# Patient Record
Sex: FEMALE | Race: WHITE | NOT HISPANIC OR LATINO | Employment: FULL TIME | URBAN - METROPOLITAN AREA
[De-identification: names, ages, dates, MRNs, and addresses within clinical notes are randomized per-mention and may not be internally consistent; named-entity substitution may affect disease eponyms.]

---

## 2020-02-09 ENCOUNTER — OFFICE VISIT (OUTPATIENT)
Dept: URGENT CARE | Facility: CLINIC | Age: 50
End: 2020-02-09
Payer: COMMERCIAL

## 2020-02-09 VITALS
HEART RATE: 89 BPM | BODY MASS INDEX: 20.83 KG/M2 | TEMPERATURE: 100 F | SYSTOLIC BLOOD PRESSURE: 139 MMHG | RESPIRATION RATE: 18 BRPM | OXYGEN SATURATION: 98 % | WEIGHT: 125 LBS | DIASTOLIC BLOOD PRESSURE: 73 MMHG | HEIGHT: 65 IN

## 2020-02-09 DIAGNOSIS — J32.9 SINUSITIS, UNSPECIFIED CHRONICITY, UNSPECIFIED LOCATION: Primary | ICD-10-CM

## 2020-02-09 PROCEDURE — 99204 PR OFFICE/OUTPT VISIT, NEW, LEVL IV, 45-59 MIN: ICD-10-PCS | Mod: S$GLB,,, | Performed by: FAMILY MEDICINE

## 2020-02-09 PROCEDURE — 99204 OFFICE O/P NEW MOD 45 MIN: CPT | Mod: S$GLB,,, | Performed by: FAMILY MEDICINE

## 2020-02-09 RX ORDER — AZITHROMYCIN 500 MG/1
TABLET, FILM COATED ORAL
Qty: 3 TABLET | Refills: 0 | Status: SHIPPED | OUTPATIENT
Start: 2020-02-09 | End: 2020-02-27 | Stop reason: ALTCHOICE

## 2020-02-09 RX ORDER — PROMETHAZINE HYDROCHLORIDE AND CODEINE PHOSPHATE 6.25; 1 MG/5ML; MG/5ML
5 SOLUTION ORAL EVERY 4 HOURS PRN
Qty: 120 ML | Refills: 0 | Status: SHIPPED | OUTPATIENT
Start: 2020-02-09 | End: 2020-02-19

## 2020-02-09 RX ORDER — PREDNISONE 10 MG/1
20 TABLET ORAL DAILY
Qty: 6 TABLET | Refills: 0 | Status: SHIPPED | OUTPATIENT
Start: 2020-02-09 | End: 2020-02-12

## 2020-02-09 NOTE — PROGRESS NOTES
"Subjective:       Patient ID: Genie Paul is a 49 y.o. female.    Vitals:  height is 5' 5" (1.651 m) and weight is 56.7 kg (125 lb). Her tympanic temperature is 99.7 °F (37.6 °C). Her blood pressure is 139/73 and her pulse is 89. Her respiration is 18 and oxygen saturation is 98%.     Chief Complaint: Sinus Problem    Patient c/o of sinus problem started two weeks, patient has tried taking Day'Quil which helps a litte bit    Sinus Problem   The current episode started 1 to 4 weeks ago. Associated symptoms include chills, congestion, coughing, ear pain, headaches, a hoarse voice, sinus pressure and a sore throat. Pertinent negatives include no diaphoresis or shortness of breath. Past treatments include oral decongestants. The treatment provided no relief.       Constitution: Positive for chills and fatigue. Negative for sweating and fever.   HENT: Positive for ear pain, congestion, sinus pressure and sore throat. Negative for sinus pain and voice change.    Neck: Negative for painful lymph nodes.   Eyes: Negative for eye redness.   Respiratory: Positive for cough. Negative for chest tightness, sputum production, bloody sputum, COPD, shortness of breath, stridor, wheezing and asthma.    Gastrointestinal: Negative for nausea and vomiting.   Musculoskeletal: Negative for muscle ache.   Skin: Negative for rash.   Allergic/Immunologic: Negative for seasonal allergies and asthma.   Neurological: Positive for headaches.   Hematologic/Lymphatic: Negative for swollen lymph nodes.       Objective:      Physical Exam    nose with coryza  Right ear with fluid  No exudate  Left ear normal  Bilateral sinus TTP  S1 and S2 normal, no murmurs, clicks, gallops or rubs. Regular rate and rhythm. Chest is clear; no wheezes or rales. No edema or JVD.  Throat with pharyngitis, no exudate, copious purulent mucus and she is haorse  No LAD, cervical  Assessment:       1. Sinusitis, unspecified chronicity, unspecified location        Plan:     "     Sinusitis, unspecified chronicity, unspecified location    Other orders  -     azithromycin (ZITHROMAX) 500 MG tablet; Take 1 po daily, take 2 on day one.  Dispense: 3 tablet; Refill: 0  -     predniSONE (DELTASONE) 10 MG tablet; Take 2 tablets (20 mg total) by mouth once daily. for 3 days  Dispense: 6 tablet; Refill: 0  -     promethazine-codeine 6.25-10 mg/5 ml (PHENERGAN WITH CODEINE) 6.25-10 mg/5 mL syrup; Take 5 mLs by mouth every 4 (four) hours as needed for Cough.  Dispense: 120 mL; Refill: 0         Potential medication side effects were discussed with the patient; let me know if any occur.    The current medical regimen is effective;  continue present plan and medications.

## 2020-02-27 ENCOUNTER — OFFICE VISIT (OUTPATIENT)
Dept: URGENT CARE | Facility: CLINIC | Age: 50
End: 2020-02-27
Payer: COMMERCIAL

## 2020-02-27 VITALS
HEIGHT: 65 IN | OXYGEN SATURATION: 96 % | TEMPERATURE: 101 F | WEIGHT: 125 LBS | SYSTOLIC BLOOD PRESSURE: 125 MMHG | HEART RATE: 107 BPM | RESPIRATION RATE: 18 BRPM | BODY MASS INDEX: 20.83 KG/M2 | DIASTOLIC BLOOD PRESSURE: 77 MMHG

## 2020-02-27 DIAGNOSIS — R05.9 COUGH: ICD-10-CM

## 2020-02-27 DIAGNOSIS — K21.9 GASTROESOPHAGEAL REFLUX DISEASE, ESOPHAGITIS PRESENCE NOT SPECIFIED: ICD-10-CM

## 2020-02-27 DIAGNOSIS — R50.9 FEVER, UNSPECIFIED FEVER CAUSE: ICD-10-CM

## 2020-02-27 DIAGNOSIS — Z76.89 ENCOUNTER TO ESTABLISH CARE: ICD-10-CM

## 2020-02-27 DIAGNOSIS — J11.1 INFLUENZA: Primary | ICD-10-CM

## 2020-02-27 LAB
CTP QC/QA: YES
FLUAV AG NPH QL: NEGATIVE
FLUBV AG NPH QL: NEGATIVE

## 2020-02-27 PROCEDURE — 87804 POCT INFLUENZA A/B: ICD-10-PCS | Mod: 59,QW,S$GLB, | Performed by: FAMILY MEDICINE

## 2020-02-27 PROCEDURE — 99214 PR OFFICE/OUTPT VISIT, EST, LEVL IV, 30-39 MIN: ICD-10-PCS | Mod: 25,S$GLB,, | Performed by: FAMILY MEDICINE

## 2020-02-27 PROCEDURE — 87804 INFLUENZA ASSAY W/OPTIC: CPT | Mod: QW,S$GLB,, | Performed by: FAMILY MEDICINE

## 2020-02-27 PROCEDURE — 99214 OFFICE O/P EST MOD 30 MIN: CPT | Mod: 25,S$GLB,, | Performed by: FAMILY MEDICINE

## 2020-02-27 RX ORDER — OSELTAMIVIR PHOSPHATE 75 MG/1
75 CAPSULE ORAL 2 TIMES DAILY
Qty: 10 CAPSULE | Refills: 0 | Status: SHIPPED | OUTPATIENT
Start: 2020-02-27 | End: 2020-03-03

## 2020-02-28 NOTE — PROGRESS NOTES
Subjective:       Patient ID: Genie Paul is a 49 y.o. female.    Vitals:  vitals were not taken for this visit.     Chief Complaint: Sinus Problem    Sinus Problem       ROS    Objective:      Physical Exam      Assessment:       No diagnosis found.    Plan:         There are no diagnoses linked to this encounter.

## 2020-02-28 NOTE — PROGRESS NOTES
"Subjective:       Patient ID: Genie Paul is a 49 y.o. female.    Vitals:  height is 5' 5" (1.651 m) and weight is 56.7 kg (125 lb). Her tympanic temperature is 101.2 °F (38.4 °C) (abnormal). Her blood pressure is 125/77 and her pulse is 107. Her respiration is 18 and oxygen saturation is 96%.     Chief Complaint: Sinus Problem    Patient present with flu like symptoms started a month ago, patient has tried taking Mucin-ex but no relief.   She was seen a month ago and diagnosed with a sinus infection and given an antibiotic. She seemed to be some better though she stioll had a cough but she didn't feel bad. She was still having a mild cough but she was thinking this may be due to GERD which both her mom and sister have. Now she suddenly got worse in the past day or so with fever and aches and worsening cough and chest pain    Sinus Problem   The current episode started more than 1 month ago. Associated symptoms include chills, congestion, coughing, ear pain, headaches, a hoarse voice, sinus pressure and a sore throat. Pertinent negatives include no diaphoresis, shortness of breath or sneezing. Past treatments include oral decongestants. The treatment provided no relief.       Constitution: Positive for chills and fatigue. Negative for sweating and fever.   HENT: Positive for ear pain, congestion, sinus pressure and sore throat. Negative for sinus pain and voice change.    Neck: Negative for painful lymph nodes.   Eyes: Negative for eye redness.   Respiratory: Positive for cough. Negative for chest tightness, sputum production, bloody sputum, COPD, shortness of breath, stridor, wheezing and asthma.    Gastrointestinal: Negative for nausea and vomiting.   Musculoskeletal: Positive for muscle ache.   Skin: Negative for rash.   Allergic/Immunologic: Negative for seasonal allergies, asthma and sneezing.   Neurological: Positive for headaches.   Hematologic/Lymphatic: Negative for swollen lymph nodes.       Objective:    "   Physical Exam   Constitutional: She is oriented to person, place, and time. She appears well-developed and well-nourished. She is cooperative.  Non-toxic appearance. She does not have a sickly appearance. She does not appear ill. No distress.   HENT:   Head: Normocephalic and atraumatic.   Right Ear: Hearing, tympanic membrane, external ear and ear canal normal.   Left Ear: Hearing, tympanic membrane, external ear and ear canal normal.   Nose: No mucosal edema, rhinorrhea or nasal deformity. No epistaxis. Right sinus exhibits no maxillary sinus tenderness and no frontal sinus tenderness. Left sinus exhibits no maxillary sinus tenderness and no frontal sinus tenderness.   Mouth/Throat: Uvula is midline and mucous membranes are normal. No trismus in the jaw. Normal dentition. No uvula swelling. No oropharyngeal exudate, posterior oropharyngeal edema or posterior oropharyngeal erythema.   Clear rhinorrhea  And mild red throat   Eyes: Conjunctivae and lids are normal. No scleral icterus.   Neck: Trachea normal, full passive range of motion without pain and phonation normal. Neck supple. No neck rigidity. No edema and no erythema present.   Cardiovascular: Normal rate, regular rhythm, normal heart sounds, intact distal pulses and normal pulses.   Pulmonary/Chest: Effort normal. No respiratory distress. She has no decreased breath sounds. She has no rhonchi.   Loose course rhonchi   Abdominal: Normal appearance.   Musculoskeletal: Normal range of motion. She exhibits no edema or deformity.   Neurological: She is alert and oriented to person, place, and time. She exhibits normal muscle tone. Coordination normal.   Skin: Skin is warm, dry, intact, not diaphoretic and not pale.   Psychiatric: She has a normal mood and affect. Her speech is normal and behavior is normal. Judgment and thought content normal. Cognition and memory are normal.   Nursing note and vitals reviewed.        Assessment:       1. Influenza    2.  Fever, unspecified fever cause    3. Gastroesophageal reflux disease, esophagitis presence not specified    4. Cough    5. Encounter to establish care        Plan:         Influenza  -     oseltamivir (TAMIFLU) 75 MG capsule; Take 1 capsule (75 mg total) by mouth 2 (two) times daily. for 10 doses  Dispense: 10 capsule; Refill: 0    Fever, unspecified fever cause  -     POCT Influenza A/B    Gastroesophageal reflux disease, esophagitis presence not specified  -     ranitidine (ZANTAC) 150 MG tablet; Take 1 tablet (150 mg total) by mouth 2 (two) times daily.  Dispense: 60 tablet; Refill: 0  -     Ambulatory referral/consult to Internal Medicine    Cough  -     ranitidine (ZANTAC) 150 MG tablet; Take 1 tablet (150 mg total) by mouth 2 (two) times daily.  Dispense: 60 tablet; Refill: 0  -     Ambulatory referral/consult to Internal Medicine    Encounter to establish care  -     Ambulatory referral/consult to Internal Medicine      Patient Instructions       Influenza (Adult)    Influenza is also called the flu. It is a viral illness that affects the air passages of your lungs. It is different from the common cold. The flu can easily be passed from one to person to another. It may be spread through the air by coughing and sneezing. Or it can be spread by touching the sick person and then touching your own eyes, nose, or mouth.  The flu starts 1 to 3 days after you are exposed to the flu virus. It may last for 1 to 2 weeks but many people feel tired or fatigued for many weeks afterward. You usually dont need to take antibiotics unless you have a complication. This might be an ear or sinus infection or pneumonia.  Symptoms of the flu may be mild or severe. They can include extreme tiredness (wanting to stay in bed all day), chills, fevers, muscle aches, soreness with eye movement, headache, and a dry, hacking cough.  Home care  Follow these guidelines when caring for yourself at home:  · Avoid being around cigarette  smoke, whether yours or other peoples.  · Acetaminophen or ibuprofen will help ease your fever, muscle aches, and headache. Dont give aspirin to anyone younger than 18 who has the flu. Aspirin can harm the liver.  · Nausea and loss of appetite are common with the flu. Eat light meals. Drink 6 to 8 glasses of liquids every day. Good choices are water, sport drinks, soft drinks without caffeine, juices, tea, and soup. Extra fluids will also help loosen secretions in your nose and lungs.  · Over-the-counter cold medicines will not make the flu go away faster. But the medicines may help with coughing, sore throat, and congestion in your nose and sinuses. Dont use a decongestant if you have high blood pressure.  · Stay home until your fever has been gone for at least 24 hours without using medicine to reduce fever.  Follow-up care  Follow up with your healthcare provider, or as advised, if you are not getting better over the next week.  If you are age 65 or older, talk with your provider about getting a pneumococcal vaccine every 5 years. You should also get this vaccine if you have chronic asthma or COPD. All adults should get a flu vaccine every fall. Ask your provider about this.  When to seek medical advice  Call your healthcare provider right away if any of these occur:  · Cough with lots of colored mucus (sputum) or blood in your mucus  · Chest pain, shortness of breath, wheezing, or trouble breathing  · Severe headache, or face, neck, or ear pain  · New rash with fever  · Fever of 100.4°F (38°C) or higher, or as directed by your healthcare provider  · Confusion, behavior change, or seizure  · Severe weakness or dizziness  · You get a new fever or cough after getting better for a few days  Date Last Reviewed: 1/1/2017  © 9733-7705 [employer]. [address] [place] [address], [place] [address]. All rights reserved. This information is not intended as a substitute for professional medical care. Always follow  your healthcare professional's instructions.        How Acid Reflux Affects Your Throat    Do you have to clear your throat or cough often? Are you hoarse? Do you have trouble swallowing? If you have these or other throat symptoms, you may have acid reflux. This occurs when stomach acid flows back up and irritates your throat.  Why you have throat symptoms  There are muscles (esophageal sphincters) at both ends of the tube that carries food to your stomach (the esophagus). These muscles relax to let food pass. Then they tighten to keep stomach acid down. When the lower esophageal sphincter (LES) doesnt tighten enough, acid can flow back (reflux) from your stomach into your esophagus. This may cause heartburn. In some cases the upper esophageal sphincter (UES) also doesnt work well. Then acid can travel higher and enter your throat (pharynx). In many cases, this causes throat symptoms.  Common throat symptoms  · Need to clear your throat often  · Feeling like youre choking  · Long-term (chronic) cough  · Hoarseness  · Trouble swallowing  · Feel like you have a lump in your throat  · Sour or acid taste  · Sore throat that keeps coming back   Date Last Reviewed: 7/1/2016  © 2742-2279 [employer]. [address] [place] [address], [place] [address]. All rights reserved. This information is not intended as a substitute for professional medical care. Always follow your healthcare professional's instructions.

## 2020-11-27 ENCOUNTER — OFFICE VISIT (OUTPATIENT)
Dept: OPTOMETRY | Facility: CLINIC | Age: 50
End: 2020-11-27
Payer: COMMERCIAL

## 2020-11-27 DIAGNOSIS — H10.13 ALLERGIC CONJUNCTIVITIS OF BOTH EYES: Primary | ICD-10-CM

## 2020-11-27 PROCEDURE — 92002 PR EYE EXAM, NEW PATIENT,INTERMED: ICD-10-PCS | Mod: S$GLB,,, | Performed by: OPTOMETRIST

## 2020-11-27 PROCEDURE — 99999 PR PBB SHADOW E&M-EST. PATIENT-LVL II: CPT | Mod: PBBFAC,,, | Performed by: OPTOMETRIST

## 2020-11-27 PROCEDURE — 99999 PR PBB SHADOW E&M-EST. PATIENT-LVL II: ICD-10-PCS | Mod: PBBFAC,,, | Performed by: OPTOMETRIST

## 2020-11-27 PROCEDURE — 92002 INTRM OPH EXAM NEW PATIENT: CPT | Mod: S$GLB,,, | Performed by: OPTOMETRIST

## 2020-11-27 RX ORDER — PREDNISOLONE ACETATE 10 MG/ML
SUSPENSION/ DROPS OPHTHALMIC
Qty: 5 ML | Refills: 0 | Status: SHIPPED | OUTPATIENT
Start: 2020-11-27 | End: 2020-12-01

## 2020-11-27 RX ORDER — FLUOROMETHOLONE 1 MG/ML
1 SUSPENSION/ DROPS OPHTHALMIC 4 TIMES DAILY
Qty: 5 ML | Refills: 0 | Status: SHIPPED | OUTPATIENT
Start: 2020-11-27 | End: 2020-11-27

## 2020-11-27 NOTE — PROGRESS NOTES
HPI     Pt here for urgent visit   Has been dealing with very puffy, red, watery, itchy, swollen, and mucousy   OU x 2 months ago  Every morning has to wash eyes with washcloth because very crusty  Gritty feeling OU and soreness type of pain constantly  Feels like OS>OD but they alternate  Pt thinks she may have allergic conjunctivitis  Uses systane multiple times a day with some relief  No blurry va or photophobia   Pt concerned about infection     Last edited by Nicolette Trevino on 11/27/2020 10:49 AM. (History)            Assessment /Plan     For exam results, see Encounter Report.    Allergic conjunctivitis of both eyes    Pred Forte Place 1 drop into both eyes 4 (four) times daily. for 7 days  Then BID x 1 week    Use PF systane Q1-2 hours  Cold compresses QID    RTC 1-2 weeks for follow up

## 2020-11-27 NOTE — TELEPHONE ENCOUNTER
----- Message from Yeni Coyle sent at 11/27/2020  4:55 PM CST -----  Contact: [phone]  Pt requesting a call on her cell

## 2020-12-01 RX ORDER — PREDNISOLONE ACETATE 10 MG/ML
1 SUSPENSION/ DROPS OPHTHALMIC 4 TIMES DAILY
Qty: 5 ML | Refills: 0 | Status: SHIPPED | OUTPATIENT
Start: 2020-12-01 | End: 2020-12-08

## 2020-12-09 ENCOUNTER — OFFICE VISIT (OUTPATIENT)
Dept: OPTOMETRY | Facility: CLINIC | Age: 50
End: 2020-12-09
Payer: COMMERCIAL

## 2020-12-09 DIAGNOSIS — H16.143 SPK (SUPERFICIAL PUNCTATE KERATITIS), BILATERAL: ICD-10-CM

## 2020-12-09 DIAGNOSIS — H10.13 CONJUNCTIVITIS, ALLERGIC, BILATERAL: Primary | ICD-10-CM

## 2020-12-09 DIAGNOSIS — H04.123 DRY EYE SYNDROME, BILATERAL: ICD-10-CM

## 2020-12-09 PROCEDURE — 99999 PR PBB SHADOW E&M-EST. PATIENT-LVL II: ICD-10-PCS | Mod: PBBFAC,,, | Performed by: OPTOMETRIST

## 2020-12-09 PROCEDURE — 92012 PR EYE EXAM, EST PATIENT,INTERMED: ICD-10-PCS | Mod: S$GLB,,, | Performed by: OPTOMETRIST

## 2020-12-09 PROCEDURE — 99999 PR PBB SHADOW E&M-EST. PATIENT-LVL II: CPT | Mod: PBBFAC,,, | Performed by: OPTOMETRIST

## 2020-12-09 PROCEDURE — 92012 INTRM OPH EXAM EST PATIENT: CPT | Mod: S$GLB,,, | Performed by: OPTOMETRIST

## 2020-12-09 NOTE — PROGRESS NOTES
HPI     Pt here for f/u  Has been using FML and PF systane compliantly daily   Pt says she notices a big improvement but still not 100%   Has most pain and discomfort in morning time     Last edited by Nicolette Trevino on 12/9/2020  2:17 PM. (History)            Assessment /Plan     For exam results, see Encounter Report.    Conjunctivitis, allergic, bilateral  SPK (superficial punctate keratitis), bilateral  Dry eye syndrome, bilateral   Pred Forte QD x 2 weeks   PF systane PRN   Refresh PM ointment QHS  -     cetirizine 0.24 % Dpet; Apply 1 drop to eye 2 (two) times daily.  Dispense: 30 each; Refill: 12      RTC if condition worsens or does not improve

## 2020-12-10 ENCOUNTER — PATIENT MESSAGE (OUTPATIENT)
Dept: OPTOMETRY | Facility: CLINIC | Age: 50
End: 2020-12-10

## 2020-12-10 RX ORDER — AZELASTINE HYDROCHLORIDE 0.5 MG/ML
1 SOLUTION/ DROPS OPHTHALMIC 2 TIMES DAILY
Qty: 6 ML | Refills: 6 | Status: SHIPPED | OUTPATIENT
Start: 2020-12-10 | End: 2022-10-03

## 2021-07-07 ENCOUNTER — OFFICE VISIT (OUTPATIENT)
Dept: INTERNAL MEDICINE | Facility: CLINIC | Age: 51
End: 2021-07-07
Payer: COMMERCIAL

## 2021-07-07 DIAGNOSIS — H50.52 EXOPHORIA: ICD-10-CM

## 2021-07-07 DIAGNOSIS — H50.50 HETEROPHORIA: ICD-10-CM

## 2021-07-07 DIAGNOSIS — Z00.00 PHYSICAL EXAM, ANNUAL: ICD-10-CM

## 2021-07-07 DIAGNOSIS — Z76.89 ESTABLISHING CARE WITH NEW DOCTOR, ENCOUNTER FOR: Primary | ICD-10-CM

## 2021-07-07 PROCEDURE — 99396 PREV VISIT EST AGE 40-64: CPT | Mod: 95,,, | Performed by: STUDENT IN AN ORGANIZED HEALTH CARE EDUCATION/TRAINING PROGRAM

## 2021-07-07 PROCEDURE — 99396 PR PREVENTIVE VISIT,EST,40-64: ICD-10-PCS | Mod: 95,,, | Performed by: STUDENT IN AN ORGANIZED HEALTH CARE EDUCATION/TRAINING PROGRAM

## 2021-07-09 ENCOUNTER — HOSPITAL ENCOUNTER (OUTPATIENT)
Dept: RADIOLOGY | Facility: OTHER | Age: 51
Discharge: HOME | End: 2021-07-09
Attending: STUDENT IN AN ORGANIZED HEALTH CARE EDUCATION/TRAINING PROGRAM
Payer: COMMERCIAL

## 2021-07-09 DIAGNOSIS — Z00.00 PHYSICAL EXAM, ANNUAL: ICD-10-CM

## 2021-07-09 DIAGNOSIS — Z12.31 BREAST CANCER SCREENING BY MAMMOGRAM: ICD-10-CM

## 2021-07-09 PROCEDURE — 77067 MAMMO DIGITAL SCREENING BILAT WITH TOMO: ICD-10-PCS | Mod: 26,,, | Performed by: RADIOLOGY

## 2021-07-09 PROCEDURE — 77063 MAMMO DIGITAL SCREENING BILAT WITH TOMO: ICD-10-PCS | Mod: 26,,, | Performed by: RADIOLOGY

## 2021-07-09 PROCEDURE — 77063 BREAST TOMOSYNTHESIS BI: CPT | Mod: 26,,, | Performed by: RADIOLOGY

## 2021-07-09 PROCEDURE — 77067 SCR MAMMO BI INCL CAD: CPT | Mod: 26,,, | Performed by: RADIOLOGY

## 2021-07-09 PROCEDURE — 77067 SCR MAMMO BI INCL CAD: CPT | Mod: TC

## 2021-07-15 ENCOUNTER — OFFICE VISIT (OUTPATIENT)
Dept: INTERNAL MEDICINE | Facility: CLINIC | Age: 51
End: 2021-07-15
Payer: COMMERCIAL

## 2021-07-15 DIAGNOSIS — M33.90 HELIOTROPE EYELID RASH: ICD-10-CM

## 2021-07-15 DIAGNOSIS — H50.50 HETEROPHORIA: Primary | ICD-10-CM

## 2021-07-15 PROCEDURE — 99214 OFFICE O/P EST MOD 30 MIN: CPT | Mod: 95,,, | Performed by: STUDENT IN AN ORGANIZED HEALTH CARE EDUCATION/TRAINING PROGRAM

## 2021-07-15 PROCEDURE — 99214 PR OFFICE/OUTPT VISIT, EST, LEVL IV, 30-39 MIN: ICD-10-PCS | Mod: 95,,, | Performed by: STUDENT IN AN ORGANIZED HEALTH CARE EDUCATION/TRAINING PROGRAM

## 2021-07-15 RX ORDER — ERGOCALCIFEROL 1.25 MG/1
50000 CAPSULE ORAL
Qty: 12 CAPSULE | Refills: 4 | Status: SHIPPED | OUTPATIENT
Start: 2021-07-15 | End: 2022-10-03

## 2021-07-17 RX ORDER — ERGOCALCIFEROL 1.25 MG/1
50000 CAPSULE ORAL
Qty: 12 CAPSULE | Refills: 4 | Status: CANCELLED | OUTPATIENT
Start: 2021-07-17

## 2021-09-17 ENCOUNTER — OFFICE VISIT (OUTPATIENT)
Dept: DERMATOLOGY | Facility: CLINIC | Age: 51
End: 2021-09-17
Payer: COMMERCIAL

## 2021-09-17 DIAGNOSIS — L30.9 DERMATITIS: ICD-10-CM

## 2021-09-17 DIAGNOSIS — L60.3 ONYCHORRHEXIS: ICD-10-CM

## 2021-09-17 DIAGNOSIS — M33.90 HELIOTROPE EYELID RASH: ICD-10-CM

## 2021-09-17 DIAGNOSIS — L73.8 SEBACEOUS HYPERPLASIA: ICD-10-CM

## 2021-09-17 DIAGNOSIS — L30.9 HAND ECZEMA: ICD-10-CM

## 2021-09-17 DIAGNOSIS — Z12.83 SCREENING EXAM FOR SKIN CANCER: ICD-10-CM

## 2021-09-17 DIAGNOSIS — D22.9 MULTIPLE BENIGN NEVI: Primary | ICD-10-CM

## 2021-09-17 DIAGNOSIS — L82.1 SEBORRHEIC KERATOSES: ICD-10-CM

## 2021-09-17 DIAGNOSIS — D18.01 CHERRY ANGIOMA: ICD-10-CM

## 2021-09-17 PROCEDURE — 99205 OFFICE O/P NEW HI 60 MIN: CPT | Mod: S$GLB,,, | Performed by: DERMATOLOGY

## 2021-09-17 PROCEDURE — 99999 PR PBB SHADOW E&M-EST. PATIENT-LVL II: ICD-10-PCS | Mod: PBBFAC,,, | Performed by: DERMATOLOGY

## 2021-09-17 PROCEDURE — 1159F MED LIST DOCD IN RCRD: CPT | Mod: CPTII,S$GLB,, | Performed by: DERMATOLOGY

## 2021-09-17 PROCEDURE — 99205 PR OFFICE/OUTPT VISIT, NEW, LEVL V, 60-74 MIN: ICD-10-PCS | Mod: S$GLB,,, | Performed by: DERMATOLOGY

## 2021-09-17 PROCEDURE — 1160F PR REVIEW ALL MEDS BY PRESCRIBER/CLIN PHARMACIST DOCUMENTED: ICD-10-PCS | Mod: CPTII,S$GLB,, | Performed by: DERMATOLOGY

## 2021-09-17 PROCEDURE — 3044F PR MOST RECENT HEMOGLOBIN A1C LEVEL <7.0%: ICD-10-PCS | Mod: CPTII,S$GLB,, | Performed by: DERMATOLOGY

## 2021-09-17 PROCEDURE — 3044F HG A1C LEVEL LT 7.0%: CPT | Mod: CPTII,S$GLB,, | Performed by: DERMATOLOGY

## 2021-09-17 PROCEDURE — 1159F PR MEDICATION LIST DOCUMENTED IN MEDICAL RECORD: ICD-10-PCS | Mod: CPTII,S$GLB,, | Performed by: DERMATOLOGY

## 2021-09-17 PROCEDURE — 99999 PR PBB SHADOW E&M-EST. PATIENT-LVL II: CPT | Mod: PBBFAC,,, | Performed by: DERMATOLOGY

## 2021-09-17 PROCEDURE — 1160F RVW MEDS BY RX/DR IN RCRD: CPT | Mod: CPTII,S$GLB,, | Performed by: DERMATOLOGY

## 2021-09-17 RX ORDER — TRIAMCINOLONE ACETONIDE 1 MG/G
OINTMENT TOPICAL 2 TIMES DAILY
Qty: 30 G | Refills: 5 | Status: SHIPPED | OUTPATIENT
Start: 2021-09-17 | End: 2022-10-03

## 2021-09-19 ENCOUNTER — PATIENT MESSAGE (OUTPATIENT)
Dept: DERMATOLOGY | Facility: CLINIC | Age: 51
End: 2021-09-19

## 2021-09-27 DIAGNOSIS — M33.90 HELIOTROPE EYELID RASH: Primary | ICD-10-CM

## 2021-09-27 RX ORDER — HYDROCORTISONE 25 MG/G
CREAM TOPICAL
Qty: 20 G | Refills: 0 | Status: SHIPPED | OUTPATIENT
Start: 2021-09-27 | End: 2023-01-06

## 2021-11-26 RX ORDER — ERGOCALCIFEROL 1.25 MG/1
50000 CAPSULE ORAL
Qty: 12 CAPSULE | Refills: 4 | Status: CANCELLED | OUTPATIENT
Start: 2021-11-26

## 2022-03-21 ENCOUNTER — PATIENT MESSAGE (OUTPATIENT)
Dept: ADMINISTRATIVE | Facility: HOSPITAL | Age: 52
End: 2022-03-21
Payer: COMMERCIAL

## 2022-03-22 DIAGNOSIS — Z12.11 SCREENING FOR COLON CANCER: ICD-10-CM

## 2022-03-23 ENCOUNTER — PATIENT MESSAGE (OUTPATIENT)
Dept: ADMINISTRATIVE | Facility: HOSPITAL | Age: 52
End: 2022-03-23
Payer: COMMERCIAL

## 2022-03-23 ENCOUNTER — PATIENT OUTREACH (OUTPATIENT)
Dept: ADMINISTRATIVE | Facility: HOSPITAL | Age: 52
End: 2022-03-23
Payer: COMMERCIAL

## 2022-03-23 NOTE — PROGRESS NOTES
Chart reviewed  Immunizations reconciled   Release sent   No pap in quest  Portal message sent

## 2022-03-23 NOTE — LETTER
AUTHORIZATION FOR RELEASE OF   CONFIDENTIAL INFORMATION        We are seeing Genie Paul, date of birth 1970, in the clinic at [facility]. Kris Minor MD is the patient's PCP. Genie Paul has an outstanding lab/procedure at the time we reviewed her chart. In order to help keep her health information updated, she has authorized us to request the following medical record(s):        (  )  MAMMOGRAM                                      (  )  COLONOSCOPY      (x  )  PAP SMEAR                                          (  )  OUTSIDE LAB RESULTS     (  )  DEXA SCAN                                          (  )  EYE EXAM            (  )  FOOT EXAM                                          (  )  ENTIRE RECORD     (  )  OUTSIDE IMMUNIZATIONS                 (  )  _______________         Please fax records to [facility], Tarek C Balamane, MD, [phone]     If you have any questions, please contact Cheryl at [phone]          Patient Name: Genie Paul  : 1970  Patient Phone #: [phone]

## 2022-04-01 ENCOUNTER — TELEPHONE (OUTPATIENT)
Dept: OBSTETRICS AND GYNECOLOGY | Facility: CLINIC | Age: 52
End: 2022-04-01
Payer: COMMERCIAL

## 2022-05-16 ENCOUNTER — PATIENT OUTREACH (OUTPATIENT)
Dept: ADMINISTRATIVE | Facility: HOSPITAL | Age: 52
End: 2022-05-16
Payer: COMMERCIAL

## 2022-05-16 ENCOUNTER — PATIENT MESSAGE (OUTPATIENT)
Dept: ADMINISTRATIVE | Facility: HOSPITAL | Age: 52
End: 2022-05-16
Payer: COMMERCIAL

## 2022-06-01 ENCOUNTER — PATIENT MESSAGE (OUTPATIENT)
Dept: ADMINISTRATIVE | Facility: HOSPITAL | Age: 52
End: 2022-06-01
Payer: COMMERCIAL

## 2022-07-14 ENCOUNTER — PATIENT MESSAGE (OUTPATIENT)
Dept: ADMINISTRATIVE | Facility: HOSPITAL | Age: 52
End: 2022-07-14
Payer: COMMERCIAL

## 2022-07-26 LAB — HEMOCCULT STL QL IA: NEGATIVE

## 2022-09-19 ENCOUNTER — PATIENT OUTREACH (OUTPATIENT)
Dept: ADMINISTRATIVE | Facility: HOSPITAL | Age: 52
End: 2022-09-19
Payer: COMMERCIAL

## 2022-09-19 NOTE — PROGRESS NOTES
Health Maintenance Due   Topic Date Due    TETANUS VACCINE  Never done    Cervical Cancer Screening  09/03/2016    Mammogram  07/09/2022    Shingles Vaccine (1 of 2) Never done    Influenza Vaccine (1) 09/01/2022

## 2022-10-03 ENCOUNTER — OFFICE VISIT (OUTPATIENT)
Dept: INTERNAL MEDICINE | Facility: CLINIC | Age: 52
End: 2022-10-03
Payer: COMMERCIAL

## 2022-10-03 ENCOUNTER — HOSPITAL ENCOUNTER (EMERGENCY)
Facility: OTHER | Age: 52
Discharge: HOME | End: 2022-10-03
Attending: EMERGENCY MEDICINE
Payer: COMMERCIAL

## 2022-10-03 VITALS
RESPIRATION RATE: 16 BRPM | SYSTOLIC BLOOD PRESSURE: 124 MMHG | WEIGHT: 130 LBS | BODY MASS INDEX: 21.66 KG/M2 | HEART RATE: 80 BPM | DIASTOLIC BLOOD PRESSURE: 64 MMHG | HEIGHT: 65 IN | TEMPERATURE: 99 F | OXYGEN SATURATION: 97 %

## 2022-10-03 DIAGNOSIS — R53.1 WEAKNESS: ICD-10-CM

## 2022-10-03 DIAGNOSIS — F10.939 ALCOHOL WITHDRAWAL SYNDROME WITH COMPLICATION: Primary | ICD-10-CM

## 2022-10-03 DIAGNOSIS — F10.10 ALCOHOL ABUSE: Primary | ICD-10-CM

## 2022-10-03 LAB
ALBUMIN SERPL BCP-MCNC: 4.2 G/DL (ref 3.5–5.2)
ALP SERPL-CCNC: 49 U/L (ref 55–135)
ALT SERPL W/O P-5'-P-CCNC: 16 U/L (ref 10–44)
ANION GAP SERPL CALC-SCNC: 13 MMOL/L (ref 8–16)
AST SERPL-CCNC: 21 U/L (ref 10–40)
B-OH-BUTYR BLD STRIP-SCNC: 0.5 MMOL/L (ref 0–0.5)
BASOPHILS # BLD AUTO: 0.08 K/UL (ref 0–0.2)
BASOPHILS NFR BLD: 1 % (ref 0–1.9)
BILIRUB SERPL-MCNC: 0.5 MG/DL (ref 0.1–1)
BUN SERPL-MCNC: 6 MG/DL (ref 6–20)
CALCIUM SERPL-MCNC: 9.6 MG/DL (ref 8.7–10.5)
CHLORIDE SERPL-SCNC: 105 MMOL/L (ref 95–110)
CO2 SERPL-SCNC: 19 MMOL/L (ref 23–29)
CREAT SERPL-MCNC: 0.7 MG/DL (ref 0.5–1.4)
DIFFERENTIAL METHOD: ABNORMAL
EOSINOPHIL # BLD AUTO: 0.2 K/UL (ref 0–0.5)
EOSINOPHIL NFR BLD: 2.2 % (ref 0–8)
ERYTHROCYTE [DISTWIDTH] IN BLOOD BY AUTOMATED COUNT: 13.5 % (ref 11.5–14.5)
EST. GFR  (NO RACE VARIABLE): >60 ML/MIN/1.73 M^2
ETHANOL SERPL-MCNC: <10 MG/DL
GLUCOSE SERPL-MCNC: 89 MG/DL (ref 70–110)
HCT VFR BLD AUTO: 45 % (ref 37–48.5)
HCV AB SERPL QL IA: NEGATIVE
HGB BLD-MCNC: 15.1 G/DL (ref 12–16)
HIV 1+2 AB+HIV1 P24 AG SERPL QL IA: NEGATIVE
IMM GRANULOCYTES # BLD AUTO: 0.02 K/UL (ref 0–0.04)
IMM GRANULOCYTES NFR BLD AUTO: 0.2 % (ref 0–0.5)
LIPASE SERPL-CCNC: 17 U/L (ref 4–60)
LYMPHOCYTES # BLD AUTO: 1.9 K/UL (ref 1–4.8)
LYMPHOCYTES NFR BLD: 23.4 % (ref 18–48)
MAGNESIUM SERPL-MCNC: 2.1 MG/DL (ref 1.6–2.6)
MCH RBC QN AUTO: 31.2 PG (ref 27–31)
MCHC RBC AUTO-ENTMCNC: 33.6 G/DL (ref 32–36)
MCV RBC AUTO: 93 FL (ref 82–98)
MONOCYTES # BLD AUTO: 0.4 K/UL (ref 0.3–1)
MONOCYTES NFR BLD: 5 % (ref 4–15)
NEUTROPHILS # BLD AUTO: 5.5 K/UL (ref 1.8–7.7)
NEUTROPHILS NFR BLD: 68.2 % (ref 38–73)
NRBC BLD-RTO: 0 /100 WBC
PLATELET # BLD AUTO: 268 K/UL (ref 150–450)
PMV BLD AUTO: 9.8 FL (ref 9.2–12.9)
POCT GLUCOSE: 77 MG/DL (ref 70–110)
POTASSIUM SERPL-SCNC: 4 MMOL/L (ref 3.5–5.1)
PROT SERPL-MCNC: 7.6 G/DL (ref 6–8.4)
RBC # BLD AUTO: 4.84 M/UL (ref 4–5.4)
SODIUM SERPL-SCNC: 137 MMOL/L (ref 136–145)
VIT B12 SERPL-MCNC: 878 PG/ML (ref 210–950)
WBC # BLD AUTO: 8.08 K/UL (ref 3.9–12.7)

## 2022-10-03 PROCEDURE — 82010 KETONE BODYS QUAN: CPT

## 2022-10-03 PROCEDURE — 82962 GLUCOSE BLOOD TEST: CPT

## 2022-10-03 PROCEDURE — 86803 HEPATITIS C AB TEST: CPT

## 2022-10-03 PROCEDURE — 93005 ELECTROCARDIOGRAM TRACING: CPT

## 2022-10-03 PROCEDURE — 85025 COMPLETE CBC W/AUTO DIFF WBC: CPT

## 2022-10-03 PROCEDURE — 80053 COMPREHEN METABOLIC PANEL: CPT

## 2022-10-03 PROCEDURE — 93010 ELECTROCARDIOGRAM REPORT: CPT | Mod: ,,, | Performed by: INTERNAL MEDICINE

## 2022-10-03 PROCEDURE — 99214 PR OFFICE/OUTPT VISIT, EST, LEVL IV, 30-39 MIN: ICD-10-PCS | Mod: 95,,, | Performed by: NURSE PRACTITIONER

## 2022-10-03 PROCEDURE — 99214 OFFICE O/P EST MOD 30 MIN: CPT | Mod: 95,,, | Performed by: NURSE PRACTITIONER

## 2022-10-03 PROCEDURE — 1160F PR REVIEW ALL MEDS BY PRESCRIBER/CLIN PHARMACIST DOCUMENTED: ICD-10-PCS | Mod: CPTII,95,, | Performed by: NURSE PRACTITIONER

## 2022-10-03 PROCEDURE — 87389 HIV-1 AG W/HIV-1&-2 AB AG IA: CPT

## 2022-10-03 PROCEDURE — 1159F PR MEDICATION LIST DOCUMENTED IN MEDICAL RECORD: ICD-10-PCS | Mod: CPTII,95,, | Performed by: NURSE PRACTITIONER

## 2022-10-03 PROCEDURE — 63600175 PHARM REV CODE 636 W HCPCS

## 2022-10-03 PROCEDURE — 96374 THER/PROPH/DIAG INJ IV PUSH: CPT

## 2022-10-03 PROCEDURE — 1159F MED LIST DOCD IN RCRD: CPT | Mod: CPTII,95,, | Performed by: NURSE PRACTITIONER

## 2022-10-03 PROCEDURE — 82607 VITAMIN B-12: CPT

## 2022-10-03 PROCEDURE — 83690 ASSAY OF LIPASE: CPT

## 2022-10-03 PROCEDURE — 93010 EKG 12-LEAD: ICD-10-PCS | Mod: ,,, | Performed by: INTERNAL MEDICINE

## 2022-10-03 PROCEDURE — 1160F RVW MEDS BY RX/DR IN RCRD: CPT | Mod: CPTII,95,, | Performed by: NURSE PRACTITIONER

## 2022-10-03 PROCEDURE — 96361 HYDRATE IV INFUSION ADD-ON: CPT

## 2022-10-03 PROCEDURE — 96375 TX/PRO/DX INJ NEW DRUG ADDON: CPT

## 2022-10-03 PROCEDURE — 25000003 PHARM REV CODE 250

## 2022-10-03 PROCEDURE — 99284 EMERGENCY DEPT VISIT MOD MDM: CPT | Mod: 25

## 2022-10-03 PROCEDURE — 82077 ASSAY SPEC XCP UR&BREATH IA: CPT | Performed by: EMERGENCY MEDICINE

## 2022-10-03 PROCEDURE — 83735 ASSAY OF MAGNESIUM: CPT

## 2022-10-03 RX ORDER — ONDANSETRON 2 MG/ML
4 INJECTION INTRAMUSCULAR; INTRAVENOUS
Status: COMPLETED | OUTPATIENT
Start: 2022-10-03 | End: 2022-10-03

## 2022-10-03 RX ORDER — LANOLIN ALCOHOL/MO/W.PET/CERES
100 CREAM (GRAM) TOPICAL
Status: COMPLETED | OUTPATIENT
Start: 2022-10-03 | End: 2022-10-03

## 2022-10-03 RX ORDER — DIAZEPAM 10 MG/2ML
2 INJECTION INTRAMUSCULAR
Status: COMPLETED | OUTPATIENT
Start: 2022-10-03 | End: 2022-10-03

## 2022-10-03 RX ADMIN — ONDANSETRON 4 MG: 2 INJECTION INTRAMUSCULAR; INTRAVENOUS at 11:10

## 2022-10-03 RX ADMIN — SODIUM CHLORIDE, SODIUM LACTATE, POTASSIUM CHLORIDE, AND CALCIUM CHLORIDE 1000 ML: .6; .31; .03; .02 INJECTION, SOLUTION INTRAVENOUS at 01:10

## 2022-10-03 RX ADMIN — SODIUM CHLORIDE, SODIUM LACTATE, POTASSIUM CHLORIDE, AND CALCIUM CHLORIDE 1000 ML: .6; .31; .03; .02 INJECTION, SOLUTION INTRAVENOUS at 11:10

## 2022-10-03 RX ADMIN — Medication 100 MG: at 11:10

## 2022-10-03 RX ADMIN — DIAZEPAM 2 MG: 5 INJECTION, SOLUTION INTRAMUSCULAR; INTRAVENOUS at 11:10

## 2022-10-03 NOTE — ED PROVIDER NOTES
"Encounter Date: 10/3/2022       History     Chief Complaint   Patient presents with    Withdrawal     C/o tremors, nausea, weakness, dizzy s/p ETOH withdrawal. Stated approx "10 drinks" daily for the past months. Last ETOH 0300am. Denies auditory or visual hallucination. Tremors noted.  all other VSS.        Ms. Paul is a pleasant 52-year-old female who presents with concerns of alcohol withdrawal. She states that she last drink 3 shots of whiskey this morning at 3:00 a.m. in order to prevent feeling ill.  Her symptoms at this time include feeling weak with associated nausea.  She states that she is "shaky."  Patient reports drinking heavily for the past couple of years, however, states that her drinking has significantly increased in the last couple of months. She states she is currently drinking a bottle of wine or more a day.  Patient has not attempted to quit drinking alcohol before, and has never been to detox or rehab.  She denies any history of seizures.  Denies fever, chills, hallucinations, shortness of breath, chest pain, abdominal pain.    The history is provided by the patient.   Review of patient's allergies indicates:  No Known Allergies  History reviewed. No pertinent past medical history.  History reviewed. No pertinent surgical history.  Family History   Problem Relation Age of Onset    Cancer Mother         GYN    Autoimmune disease Mother     Colon cancer Neg Hx     Breast cancer Neg Hx     Ovarian cancer Neg Hx      Social History     Tobacco Use    Smoking status: Never   Substance Use Topics    Alcohol use: Yes     Comment: Bottle of wine daily     Drug use: No     Review of Systems   Constitutional:  Negative for chills and fever.   HENT:  Negative for congestion, rhinorrhea and sore throat.    Eyes:  Negative for pain.   Respiratory:  Negative for cough and shortness of breath.    Cardiovascular:  Negative for chest pain.   Gastrointestinal:  Positive for nausea. Negative for abdominal " pain, diarrhea and vomiting.   Genitourinary:  Negative for dysuria and flank pain.   Musculoskeletal:  Negative for arthralgias and myalgias.   Skin: Negative.    Neurological:  Positive for tremors and weakness. Negative for seizures, numbness and headaches.   Hematological: Negative.    Psychiatric/Behavioral:  Negative for agitation, confusion and hallucinations. The patient is nervous/anxious.         Substance abuse     Physical Exam     Initial Vitals [10/03/22 1047]   BP Pulse Resp Temp SpO2   (!) 164/91 110 17 98.3 °F (36.8 °C) 97 %      MAP       --         Physical Exam    Constitutional: Vital signs are normal. She appears well-developed and well-nourished. She is cooperative.   HENT:   Head: Normocephalic and atraumatic.   Eyes: Conjunctivae and lids are normal.   Neck: Trachea normal. Neck supple. No thyroid mass present.   Cardiovascular:  Normal rate, regular rhythm and normal heart sounds.           Pulmonary/Chest: Breath sounds normal. No respiratory distress. She has no wheezes.   Abdominal: Abdomen is soft. She exhibits no distension. There is no abdominal tenderness.   Musculoskeletal:      Cervical back: Neck supple.     Neurological: She is alert and oriented to person, place, and time. She has normal strength. No cranial nerve deficit or sensory deficit. Coordination normal. GCS eye subscore is 4. GCS verbal subscore is 5. GCS motor subscore is 6.   Skin: Skin is warm, dry and intact. No rash noted.   Psychiatric: She has a normal mood and affect. Her speech is normal and behavior is normal. Thought content normal. She expresses inappropriate judgment.       ED Course     Labs Reviewed   CBC W/ AUTO DIFFERENTIAL - Abnormal; Notable for the following components:       Result Value    MCH 31.2 (*)     All other components within normal limits   COMPREHENSIVE METABOLIC PANEL - Abnormal; Notable for the following components:    CO2 19 (*)     Alkaline Phosphatase 49 (*)     All other components  within normal limits   BETA - HYDROXYBUTYRATE, SERUM   LIPASE   VITAMIN B12   MAGNESIUM   HIV 1 / 2 ANTIBODY    Narrative:     Release to patient->Immediate   HEPATITIS C ANTIBODY    Narrative:     Release to patient->Immediate   ALCOHOL,MEDICAL (ETHANOL)   POCT GLUCOSE   POCT GLUCOSE MONITORING CONTINUOUS          Imaging Results    None          Medications   lactated ringers bolus 1,000 mL (0 mLs Intravenous Stopped 10/3/22 1257)   diazePAM injection 2 mg (2 mg Intravenous Given 10/3/22 1143)   ondansetron injection 4 mg (4 mg Intravenous Given 10/3/22 1140)   thiamine tablet 100 mg (100 mg Oral Given 10/3/22 1140)   lactated ringers bolus 1,000 mL (1,000 mLs Intravenous New Bag 10/3/22 1326)     Medical Decision Making:   Initial Assessment:   Urgent evaluation of a 52-year-old with substance abuse and concern for alcohol withdrawal.  She does present with mild alcohol withdrawal so will administer Ativan at this time. She does not appear diaphoretic or tremulous.  I am not concerned for seizures at this time, however, I will keep a close eye on her.  Plan for labs and supportive care.  Differential Diagnosis:   Differential Diagnosis includes, but is not limited to:  Substance abuse/withdrawal, medication toxicity, thyroid disease, acute stress reaction, personality disorder, metabolic derangement     ED Management:  Labs reassuring for not severe alcohol withdrawal.  No evidence of alcoholic ketoacidosis, acute pancreatitis, or alcoholic hepatitis.  I do not believe patient meets criteria for emergent hospital admission for benzodiazepine therapy at this time.  Low suspicion for acute abdominal process given benign abdominal exam.  Patient would benefit from intensive outpatient therapy and potential for detox.  I educated the patient that while she is stable here today, this does not preclude her from the compensating and having worrisome symptoms that require emergent care.  I informed her about the symptoms  and educated her on returning if any of these symptoms arise.  Will provide referral for internal medicine to establish primary care as she would like a new 1, and an additional referral for Psychiatry to discuss underlying anxiety.  I will provide resources for local detox and outpatient therapy in addition to resources for 12 step meetings.  After taking into careful account the patient's history, physical exam findings, as well as empirical and objective data obtained throughout ED workup, I feel no emergent medical condition has been identified. No further evaluation or admission was felt to be required, and the patient is stable for discharge from the ED. The patient was updated with test results, overall clinical impression, and recommended further plan of care, including discharge instructions as provided and outpatient follow-up for continued evaluation and management as needed. All questions were answered. The patient expressed understanding and agreed with current plan for discharge and follow-up plan of care. Strict ED return precautions were provided, including return/worsening of current symptoms, new symptoms, or any other concerns.            Attending Attestation:     Physician Attestation Statement for NP/PA:   I have conducted a face to face encounter with this patient in addition to the NP/PA, due to NP/PA Request    Other NP/PA Attestation Additions:      Medical Decision Making: Genie Paul is a 52 y.o. female presenting with mild alcohol withdrawal.  Patient has no evidence symptoms at this point but has experienced mild symptoms upon alcohol cessation recently.  There is no piloerection, tremulousness, tachycardia.  I do not think she requires admission or further emergent benzodiazepine therapy here.  She is appropriate for close outpatient follow-up for detox.  I have very low suspicion for alternative life-threatening process such as ACS or arrhythmia.  EKG reviewed.  There is a benign  abdominal exam with no tenderness, distention, masses, palpable hernias.  I doubt acute intra-abdominal process.  On my exam cranial nerves 3-12 intact with 5/5 strength sensation.  I doubt acute intracranial process such as CVA.  Laboratories reviewed.  Detailed return precautions reviewed as well.             ED Course as of 10/03/22 1448   Mon Oct 03, 2022   1230 Lipase: 17 [LAITH]   1230 AST: 21 [LAITH]   1230 ALT: 16 [LAITH]   1230 Beta-Hydroxybutyrate: 0.5 [LAITH]   1357 Alcohol, Serum: <10 [LAITH]   1358 POCT Glucose: 77 [LAITH]   1440 EKG:  NSR, rate of 81, normal intervals, L axis.  There are no acute ST or T wave changes suggestive of acute ischemia or infarction.  No sign of arrhythmia including no delta waves, Brugada syndrome, or signs of hypertrophic cardiomyopathy. [MR]      ED Course User Index  [LAITH] Frannie Ugarte PA-C  [MR] Hector Porras MD                 Clinical Impression:   Final diagnoses:  [F10.10] Alcohol abuse (Primary)  [R53.1] Weakness        ED Disposition Condition    Discharge Stable          ED Prescriptions    None       Follow-up Information       Follow up With Specialties Details Why Contact Info    Kris Minor MD Family Medicine  For follow-up [address] [facility] [id number]  [phone]      [facility] Emergency Dept Emergency Medicine  If symptoms worsen [address] [facility] [id number] [phone]             Frannie Ugarte PA-C  10/03/22 0803

## 2022-10-03 NOTE — ED NOTES
Pt rounding complete.  Pain 0/10. Pt resting comfortably in bed. Denies any hallucinations. No vomiting noted. Pt aaox4.  Restroom and comfort needs addressed.  Pt updated on plan of care.  Call light within reach.  Will continue to monitor.

## 2022-10-03 NOTE — PROGRESS NOTES
Subjective:       Patient ID: Genie Paul is a 52 y.o. female.    Chief Complaint: No chief complaint on file.    HPI    This is a 51 y/o female patient of Dr. Giron who is new to me and presents with c/o alcohol withdrawal. She reports is getting tremors, chest tightness. She reports that she doesn't want to drink anymore but is afraid of the physical symptoms she is getting from the withdrawals.     Discussed going to the ER for immediate treatment of the physical symptoms and pysch eval. Pt willing to go to the ER. Reports she lives a few blocks from [facility] [place] and will go there.       Review of Systems   Constitutional:  Positive for activity change. Negative for unexpected weight change.   HENT:  Negative for hearing loss, rhinorrhea and trouble swallowing.    Eyes:  Negative for discharge and visual disturbance.   Respiratory:  Negative for chest tightness and wheezing.    Cardiovascular:  Positive for palpitations. Negative for chest pain.   Gastrointestinal:  Positive for diarrhea. Negative for blood in stool, constipation and vomiting.   Endocrine: Negative for polydipsia and polyuria.   Genitourinary:  Negative for difficulty urinating, dysuria, hematuria and menstrual problem.   Musculoskeletal:  Negative for arthralgias, joint swelling and neck pain.   Neurological:  Positive for weakness and headaches.   Psychiatric/Behavioral:  Positive for confusion and dysphoric mood.        Objective:      Physical Exam  Constitutional:       General: She is not in acute distress.     Appearance: Normal appearance. She is ill-appearing.   HENT:      Head: Normocephalic and atraumatic.   Cardiovascular:      Comments: No s/s of distress noted  Pulmonary:      Effort: Pulmonary effort is normal. No respiratory distress.   Skin:     General: Skin is dry.   Neurological:      Mental Status: She is alert and oriented to person, place, and time.       Assessment:       Problem List Items Addressed This Visit    None       Plan:       Called [facility] to make aware of sending pt from her home for immediate treatment of withdrawal symptoms and psych eval

## 2022-10-03 NOTE — ED NOTES
Pt rounding complete.  Pain 0/10. Pt reports nausea has subsided. Resting comfortably in bed. Restroom and comfort needs addressed.  Pt updated on plan of care.  Call light within reach.  Will continue to monitor.

## 2022-10-03 NOTE — DISCHARGE INSTRUCTIONS
The next best thing you can do is get in/on a meeting and find a sponsor. Here's some resources for meetings:    [facility] [place]:  www.[email or web address]  24 Hour Hotline: (259) 557-8434  There's a list of every meeting (in person and online) on this website.    [facility] (Zoom AA meeting)  8am every day  [email or web address]    Here are some detox centers -- unfortunately, due to low resources, you will likely be put on a wait list for a couple days before you're able to get in so it may be worth calling them today and getting put on the wait list -- you can always choose not to go:    [facility]  [email or web address]/  [phone]    [facility]  [email or web address]  [phone]    [facility]  [email or web address]  [phone]    Intensive outpatient programs  [facility]  [phone] option 2   [email or web address]    Please return to the ED if you experience new or worsening symptoms, including but not limited to: nausea and vomiting, sweating, tremors, seizures, hallucinations    One day at a time :)

## 2022-10-03 NOTE — ED TRIAGE NOTES
"Pt reports to ED in alcohol withdrawal. She reports last drink was around 3am. She c/o tremors, heart pounding, and "buzzing" in her head. Denies hallucinations. She is aaox4. Vitals are stable at this time.  "

## 2022-10-11 ENCOUNTER — TELEPHONE (OUTPATIENT)
Dept: DERMATOLOGY | Facility: CLINIC | Age: 52
End: 2022-10-11
Payer: COMMERCIAL

## 2022-11-01 ENCOUNTER — OFFICE VISIT (OUTPATIENT)
Dept: OBSTETRICS AND GYNECOLOGY | Facility: CLINIC | Age: 52
End: 2022-11-01
Payer: COMMERCIAL

## 2022-11-01 VITALS
WEIGHT: 135.38 LBS | BODY MASS INDEX: 21.76 KG/M2 | HEIGHT: 66 IN | SYSTOLIC BLOOD PRESSURE: 126 MMHG | DIASTOLIC BLOOD PRESSURE: 76 MMHG

## 2022-11-01 DIAGNOSIS — Z01.419 WELL WOMAN EXAM: Primary | ICD-10-CM

## 2022-11-01 DIAGNOSIS — Z12.31 ENCOUNTER FOR SCREENING MAMMOGRAM FOR BREAST CANCER: ICD-10-CM

## 2022-11-01 PROCEDURE — 3074F PR MOST RECENT SYSTOLIC BLOOD PRESSURE < 130 MM HG: ICD-10-PCS | Mod: CPTII,S$GLB,, | Performed by: STUDENT IN AN ORGANIZED HEALTH CARE EDUCATION/TRAINING PROGRAM

## 2022-11-01 PROCEDURE — 3078F DIAST BP <80 MM HG: CPT | Mod: CPTII,S$GLB,, | Performed by: STUDENT IN AN ORGANIZED HEALTH CARE EDUCATION/TRAINING PROGRAM

## 2022-11-01 PROCEDURE — 99999 PR PBB SHADOW E&M-EST. PATIENT-LVL III: ICD-10-PCS | Mod: PBBFAC,,, | Performed by: STUDENT IN AN ORGANIZED HEALTH CARE EDUCATION/TRAINING PROGRAM

## 2022-11-01 PROCEDURE — 99386 PR PREVENTIVE VISIT,NEW,40-64: ICD-10-PCS | Mod: S$GLB,,, | Performed by: STUDENT IN AN ORGANIZED HEALTH CARE EDUCATION/TRAINING PROGRAM

## 2022-11-01 PROCEDURE — 1159F PR MEDICATION LIST DOCUMENTED IN MEDICAL RECORD: ICD-10-PCS | Mod: CPTII,S$GLB,, | Performed by: STUDENT IN AN ORGANIZED HEALTH CARE EDUCATION/TRAINING PROGRAM

## 2022-11-01 PROCEDURE — 99999 PR PBB SHADOW E&M-EST. PATIENT-LVL III: CPT | Mod: PBBFAC,,, | Performed by: STUDENT IN AN ORGANIZED HEALTH CARE EDUCATION/TRAINING PROGRAM

## 2022-11-01 PROCEDURE — 3078F PR MOST RECENT DIASTOLIC BLOOD PRESSURE < 80 MM HG: ICD-10-PCS | Mod: CPTII,S$GLB,, | Performed by: STUDENT IN AN ORGANIZED HEALTH CARE EDUCATION/TRAINING PROGRAM

## 2022-11-01 PROCEDURE — 99386 PREV VISIT NEW AGE 40-64: CPT | Mod: S$GLB,,, | Performed by: STUDENT IN AN ORGANIZED HEALTH CARE EDUCATION/TRAINING PROGRAM

## 2022-11-01 PROCEDURE — 1160F RVW MEDS BY RX/DR IN RCRD: CPT | Mod: CPTII,S$GLB,, | Performed by: STUDENT IN AN ORGANIZED HEALTH CARE EDUCATION/TRAINING PROGRAM

## 2022-11-01 PROCEDURE — 87624 HPV HI-RISK TYP POOLED RSLT: CPT | Performed by: STUDENT IN AN ORGANIZED HEALTH CARE EDUCATION/TRAINING PROGRAM

## 2022-11-01 PROCEDURE — 3074F SYST BP LT 130 MM HG: CPT | Mod: CPTII,S$GLB,, | Performed by: STUDENT IN AN ORGANIZED HEALTH CARE EDUCATION/TRAINING PROGRAM

## 2022-11-01 PROCEDURE — 88175 CYTOPATH C/V AUTO FLUID REDO: CPT | Performed by: STUDENT IN AN ORGANIZED HEALTH CARE EDUCATION/TRAINING PROGRAM

## 2022-11-01 PROCEDURE — 1159F MED LIST DOCD IN RCRD: CPT | Mod: CPTII,S$GLB,, | Performed by: STUDENT IN AN ORGANIZED HEALTH CARE EDUCATION/TRAINING PROGRAM

## 2022-11-01 PROCEDURE — 1160F PR REVIEW ALL MEDS BY PRESCRIBER/CLIN PHARMACIST DOCUMENTED: ICD-10-PCS | Mod: CPTII,S$GLB,, | Performed by: STUDENT IN AN ORGANIZED HEALTH CARE EDUCATION/TRAINING PROGRAM

## 2022-11-01 NOTE — PROGRESS NOTES
History & Physical  Gynecology      SUBJECTIVE:     Chief Complaint: Well Woman       History of Present Illness:    Genie Paul is a 52 y.o.  who presents for annual physical exam.   She has no complaints.    She reports her periods are regular interval with normal flow. Starting to have some occasional hot flashes.  She is not currently sexually active. Declines STI screening today.  No vaginal discharge, odor, or itching. She denies dyspareunia and post-coital bleeding.     She denies urinary urgency, dysuria, frequency. Does have mild MOY.  She has a reported history of abnormal pap smear at an outside facility.   Last pap on file was  and was normal.     She has a family history of breast cancer in a maternal aunt. No FHx colon or ovarian cancer.  Last mammogram was  and was benign.   Stool testing for colon cancer screening in 2022 was negative.     She does not and has never used tobacco products.  She drinks alcohol, states working on [employer] back.   She does not use recreational or other drugs.   She lives alone and reports she feels safe in her home.       Review of patient's allergies indicates:  No Known Allergies    History reviewed. No pertinent past medical history.  History reviewed. No pertinent surgical history.  OB History          0    Para        Term   0            AB        Living             SAB        IAB        Ectopic        Multiple        Live Births                   Family History   Problem Relation Age of Onset    Cancer Mother         GYN    Autoimmune disease Mother     Breast cancer Other     Colon cancer Neg Hx     Ovarian cancer Neg Hx      Social History     Tobacco Use    Smoking status: Never   Substance Use Topics    Alcohol use: Yes     Comment: Bottle of wine daily     Drug use: No       Current Outpatient Medications   Medication Sig    hydrocortisone 2.5 % cream Apply when red and scaly, avoid usage for more than 10 days     No current  facility-administered medications for this visit.         Review of Systems:  Review of Systems   Constitutional:  Negative for chills and fever.   Respiratory:  Negative for shortness of breath.    Cardiovascular:  Negative for chest pain.   Gastrointestinal:  Negative for abdominal pain, constipation, diarrhea, nausea and vomiting.   Endocrine: Positive for hot flashes.   Genitourinary:  Negative for dysuria, menstrual problem, pelvic pain, vaginal bleeding, vaginal discharge and vaginal odor.   Integumentary:  Negative for breast mass, nipple discharge and breast skin changes.   Neurological:  Negative for headaches.   Psychiatric/Behavioral:  Positive for sleep disturbance. Negative for depression.    Breast: Negative for mass, nipple discharge and skin changes     OBJECTIVE:     Physical Exam:  Physical Exam  Vitals reviewed. Exam conducted with a chaperone present.   Constitutional:       General: She is not in acute distress.     Appearance: She is well-developed. She is not ill-appearing, toxic-appearing or diaphoretic.   HENT:      Head: Normocephalic and atraumatic.   Eyes:      Conjunctiva/sclera: Conjunctivae normal.   Cardiovascular:      Rate and Rhythm: Normal rate.   Pulmonary:      Effort: Pulmonary effort is normal. No respiratory distress.   Chest:   Breasts:     Right: Normal. No swelling, bleeding, inverted nipple, mass, nipple discharge, skin change or tenderness.      Left: Normal. No swelling, bleeding, inverted nipple, mass, nipple discharge, skin change or tenderness.   Abdominal:      Palpations: Abdomen is soft. There is no mass.      Tenderness: There is no abdominal tenderness. There is no guarding or rebound.   Genitourinary:     General: Normal vulva.      Vagina: Normal. No vaginal discharge or bleeding.      Cervix: No cervical motion tenderness.      Uterus: Not enlarged and not tender.       Adnexa:         Right: No mass, tenderness or fullness.          Left: No mass, tenderness  or fullness.        Comments: External genitalia: Normal  Urethral: Nontender, no masses  Urethral meatus: Normal  Bladder: Non-tender  Musculoskeletal:         General: Normal range of motion.      Cervical back: Normal range of motion.   Skin:     General: Skin is warm and dry.   Neurological:      Mental Status: She is alert.   Psychiatric:         Mood and Affect: Mood normal.         Behavior: Behavior normal.         ASSESSMENT:       ICD-10-CM ICD-9-CM    1. Well woman exam  Z01.419 V72.31 Liquid-Based Pap Smear, Screening      HPV High Risk Genotypes, PCR      2. Encounter for screening mammogram for breast cancer  Z12.31 V76.12 Mammo Digital Screening Bilat w/ Sanjya             Plan:      -- Pap and HPV today.  -- MMG ordered  -- Colon cancer screening up to date  -- STI screen declined  -- Discussed lifestyle modification for MOY symptoms: timed voiding, Kegel exercises. She will let us know if symptoms worsen.  -- Also discussed resources for help with alcohol use. She will let us know if she is interested.  -- RTC in 1 year for annual      Leanne Enciso MD

## 2022-11-22 ENCOUNTER — HOSPITAL ENCOUNTER (OUTPATIENT)
Dept: RADIOLOGY | Facility: OTHER | Age: 52
Discharge: HOME | End: 2022-11-22
Attending: STUDENT IN AN ORGANIZED HEALTH CARE EDUCATION/TRAINING PROGRAM
Payer: COMMERCIAL

## 2022-11-22 DIAGNOSIS — Z12.31 ENCOUNTER FOR SCREENING MAMMOGRAM FOR BREAST CANCER: ICD-10-CM

## 2022-11-22 PROCEDURE — 77063 BREAST TOMOSYNTHESIS BI: CPT | Mod: 26,,, | Performed by: INTERNAL MEDICINE

## 2022-11-22 PROCEDURE — 77063 MAMMO DIGITAL SCREENING BILAT WITH TOMO: ICD-10-PCS | Mod: 26,,, | Performed by: INTERNAL MEDICINE

## 2022-11-22 PROCEDURE — 77063 BREAST TOMOSYNTHESIS BI: CPT | Mod: TC

## 2022-11-22 PROCEDURE — 77067 SCR MAMMO BI INCL CAD: CPT | Mod: 26,,, | Performed by: INTERNAL MEDICINE

## 2022-11-22 PROCEDURE — 77067 MAMMO DIGITAL SCREENING BILAT WITH TOMO: ICD-10-PCS | Mod: 26,,, | Performed by: INTERNAL MEDICINE

## 2022-11-22 PROCEDURE — 77067 SCR MAMMO BI INCL CAD: CPT | Mod: TC

## 2022-12-01 ENCOUNTER — OFFICE VISIT (OUTPATIENT)
Dept: DERMATOLOGY | Facility: CLINIC | Age: 52
End: 2022-12-01
Payer: COMMERCIAL

## 2022-12-01 DIAGNOSIS — L72.0 EPIDERMAL INCLUSION CYST: ICD-10-CM

## 2022-12-01 DIAGNOSIS — D23.9 DERMATOFIBROMA: Primary | ICD-10-CM

## 2022-12-01 PROCEDURE — 99212 OFFICE O/P EST SF 10 MIN: CPT | Mod: S$GLB,,, | Performed by: STUDENT IN AN ORGANIZED HEALTH CARE EDUCATION/TRAINING PROGRAM

## 2022-12-01 PROCEDURE — 99999 PR PBB SHADOW E&M-EST. PATIENT-LVL II: CPT | Mod: PBBFAC,,, | Performed by: STUDENT IN AN ORGANIZED HEALTH CARE EDUCATION/TRAINING PROGRAM

## 2022-12-01 PROCEDURE — 1159F MED LIST DOCD IN RCRD: CPT | Mod: CPTII,S$GLB,, | Performed by: STUDENT IN AN ORGANIZED HEALTH CARE EDUCATION/TRAINING PROGRAM

## 2022-12-01 PROCEDURE — 99212 PR OFFICE/OUTPT VISIT, EST, LEVL II, 10-19 MIN: ICD-10-PCS | Mod: S$GLB,,, | Performed by: STUDENT IN AN ORGANIZED HEALTH CARE EDUCATION/TRAINING PROGRAM

## 2022-12-01 PROCEDURE — 99999 PR PBB SHADOW E&M-EST. PATIENT-LVL II: ICD-10-PCS | Mod: PBBFAC,,, | Performed by: STUDENT IN AN ORGANIZED HEALTH CARE EDUCATION/TRAINING PROGRAM

## 2022-12-01 PROCEDURE — 1160F PR REVIEW ALL MEDS BY PRESCRIBER/CLIN PHARMACIST DOCUMENTED: ICD-10-PCS | Mod: CPTII,S$GLB,, | Performed by: STUDENT IN AN ORGANIZED HEALTH CARE EDUCATION/TRAINING PROGRAM

## 2022-12-01 PROCEDURE — 1160F RVW MEDS BY RX/DR IN RCRD: CPT | Mod: CPTII,S$GLB,, | Performed by: STUDENT IN AN ORGANIZED HEALTH CARE EDUCATION/TRAINING PROGRAM

## 2022-12-01 PROCEDURE — 1159F PR MEDICATION LIST DOCUMENTED IN MEDICAL RECORD: ICD-10-PCS | Mod: CPTII,S$GLB,, | Performed by: STUDENT IN AN ORGANIZED HEALTH CARE EDUCATION/TRAINING PROGRAM

## 2022-12-01 NOTE — PROGRESS NOTES
Subjective:       Patient ID:  Genie Paul is a 52 y.o. female who presents for   Chief Complaint   Patient presents with    Spot     Left shoulder, back     History of Present Illness: The patient presents to [facility] care.     No personal hx skin cancer.     Patient with new complaint of lesion(s)  Location: left shoulder  Duration: few months  Symptoms: mole  Relieving factors/Previous treatments: none    Patient with new complaint of lesion(s)  Location: back  Duration: few months   Symptoms: feels like a knot  Relieving factors/Previous treatments: none              Review of Systems   Skin:  Positive for daily sunscreen use and activity-related sunscreen use.   Hematologic/Lymphatic: Does not bruise/bleed easily.      Objective:    Physical Exam   Constitutional: She appears well-developed and well-nourished. No distress.   Neurological: She is alert and oriented to person, place, and time. She is not disoriented.   Psychiatric: She has a normal mood and affect.   Skin:   Areas Examined (abnormalities noted in diagram):   Back Inspection Performed  LUE Inspection Performed            Diagram Legend     Erythematous scaling macule/papule c/w actinic keratosis       Vascular papule c/w angioma      Pigmented verrucoid papule/plaque c/w seborrheic keratosis      Yellow umbilicated papule c/w sebaceous hyperplasia      Irregularly shaped tan macule c/w lentigo     1-2 mm smooth white papules consistent with Milia      Movable subcutaneous cyst with punctum c/w epidermal inclusion cyst      Subcutaneous movable cyst c/w pilar cyst      Firm pink to brown papule c/w dermatofibroma      Pedunculated fleshy papule(s) c/w skin tag(s)      Evenly pigmented macule c/w junctional nevus     Mildly variegated pigmented, slightly irregular-bordered macule c/w mildly atypical nevus      Flesh colored to evenly pigmented papule c/w intradermal nevus       Pink pearly papule/plaque c/w basal cell carcinoma       Erythematous hyperkeratotic cursted plaque c/w SCC      Surgical scar with no sign of skin cancer recurrence      Open and closed comedones      Inflammatory papules and pustules      Verrucoid papule consistent consistent with wart     Erythematous eczematous patches and plaques     Dystrophic onycholytic nail with subungual debris c/w onychomycosis     Umbilicated papule    Erythematous-base heme-crusted tan verrucoid plaque consistent with inflamed seborrheic keratosis     Erythematous Silvery Scaling Plaque c/w Psoriasis     See annotation      Assessment / Plan:        Dermatofibroma  Reassurance given to patient. No treatment is necessary.     Epidermal inclusion cyst  Reassurance given to patient. No treatment is necessary.   Discussed treatment options - excision vs observation. Cysts may recur with excision. Pt will defer treatment at this time.            Follow up if symptoms worsen or fail to improve.

## 2023-01-06 ENCOUNTER — OFFICE VISIT (OUTPATIENT)
Dept: PRIMARY CARE CLINIC | Facility: CLINIC | Age: 53
End: 2023-01-06
Payer: COMMERCIAL

## 2023-01-06 ENCOUNTER — HOSPITAL ENCOUNTER (OUTPATIENT)
Dept: RADIOLOGY | Facility: OTHER | Age: 53
Discharge: HOME | End: 2023-01-06
Attending: FAMILY MEDICINE
Payer: COMMERCIAL

## 2023-01-06 ENCOUNTER — PATIENT MESSAGE (OUTPATIENT)
Dept: PRIMARY CARE CLINIC | Facility: CLINIC | Age: 53
End: 2023-01-06

## 2023-01-06 VITALS
WEIGHT: 135.56 LBS | HEART RATE: 78 BPM | BODY MASS INDEX: 21.79 KG/M2 | DIASTOLIC BLOOD PRESSURE: 78 MMHG | SYSTOLIC BLOOD PRESSURE: 110 MMHG | OXYGEN SATURATION: 99 % | HEIGHT: 66 IN | TEMPERATURE: 99 F

## 2023-01-06 DIAGNOSIS — M33.90 HELIOTROPE EYELID RASH: ICD-10-CM

## 2023-01-06 DIAGNOSIS — R10.9 ACUTE ABDOMINAL PAIN: ICD-10-CM

## 2023-01-06 DIAGNOSIS — R10.9 ACUTE ABDOMINAL PAIN: Primary | ICD-10-CM

## 2023-01-06 PROCEDURE — 3008F PR BODY MASS INDEX (BMI) DOCUMENTED: ICD-10-PCS | Mod: CPTII,S$GLB,, | Performed by: FAMILY MEDICINE

## 2023-01-06 PROCEDURE — 74177 CT ABD & PELVIS W/CONTRAST: CPT | Mod: 26,,, | Performed by: RADIOLOGY

## 2023-01-06 PROCEDURE — 3078F DIAST BP <80 MM HG: CPT | Mod: CPTII,S$GLB,, | Performed by: FAMILY MEDICINE

## 2023-01-06 PROCEDURE — 1160F PR REVIEW ALL MEDS BY PRESCRIBER/CLIN PHARMACIST DOCUMENTED: ICD-10-PCS | Mod: CPTII,S$GLB,, | Performed by: FAMILY MEDICINE

## 2023-01-06 PROCEDURE — 25500020 PHARM REV CODE 255: Performed by: FAMILY MEDICINE

## 2023-01-06 PROCEDURE — 1160F RVW MEDS BY RX/DR IN RCRD: CPT | Mod: CPTII,S$GLB,, | Performed by: FAMILY MEDICINE

## 2023-01-06 PROCEDURE — 3078F PR MOST RECENT DIASTOLIC BLOOD PRESSURE < 80 MM HG: ICD-10-PCS | Mod: CPTII,S$GLB,, | Performed by: FAMILY MEDICINE

## 2023-01-06 PROCEDURE — 3008F BODY MASS INDEX DOCD: CPT | Mod: CPTII,S$GLB,, | Performed by: FAMILY MEDICINE

## 2023-01-06 PROCEDURE — 99214 OFFICE O/P EST MOD 30 MIN: CPT | Mod: S$GLB,,, | Performed by: FAMILY MEDICINE

## 2023-01-06 PROCEDURE — 99214 PR OFFICE/OUTPT VISIT, EST, LEVL IV, 30-39 MIN: ICD-10-PCS | Mod: S$GLB,,, | Performed by: FAMILY MEDICINE

## 2023-01-06 PROCEDURE — 99999 PR PBB SHADOW E&M-EST. PATIENT-LVL IV: CPT | Mod: PBBFAC,,, | Performed by: FAMILY MEDICINE

## 2023-01-06 PROCEDURE — 1159F MED LIST DOCD IN RCRD: CPT | Mod: CPTII,S$GLB,, | Performed by: FAMILY MEDICINE

## 2023-01-06 PROCEDURE — 74177 CT ABDOMEN PELVIS WITH CONTRAST: ICD-10-PCS | Mod: 26,,, | Performed by: RADIOLOGY

## 2023-01-06 PROCEDURE — A9698 NON-RAD CONTRAST MATERIALNOC: HCPCS | Performed by: FAMILY MEDICINE

## 2023-01-06 PROCEDURE — 3074F PR MOST RECENT SYSTOLIC BLOOD PRESSURE < 130 MM HG: ICD-10-PCS | Mod: CPTII,S$GLB,, | Performed by: FAMILY MEDICINE

## 2023-01-06 PROCEDURE — 1159F PR MEDICATION LIST DOCUMENTED IN MEDICAL RECORD: ICD-10-PCS | Mod: CPTII,S$GLB,, | Performed by: FAMILY MEDICINE

## 2023-01-06 PROCEDURE — 99999 PR PBB SHADOW E&M-EST. PATIENT-LVL IV: ICD-10-PCS | Mod: PBBFAC,,, | Performed by: FAMILY MEDICINE

## 2023-01-06 PROCEDURE — 74177 CT ABD & PELVIS W/CONTRAST: CPT | Mod: TC

## 2023-01-06 PROCEDURE — 3074F SYST BP LT 130 MM HG: CPT | Mod: CPTII,S$GLB,, | Performed by: FAMILY MEDICINE

## 2023-01-06 RX ORDER — DICYCLOMINE HYDROCHLORIDE 10 MG/1
10 CAPSULE ORAL
Qty: 60 CAPSULE | Refills: 0 | Status: SHIPPED | OUTPATIENT
Start: 2023-01-06 | End: 2023-02-09

## 2023-01-06 RX ADMIN — IOHEXOL 75 ML: 350 INJECTION, SOLUTION INTRAVENOUS at 03:01

## 2023-01-06 RX ADMIN — BARIUM SULFATE 900 ML: 20 SUSPENSION ORAL at 02:01

## 2023-01-06 NOTE — PROGRESS NOTES
Subjective:       Patient ID: Genie Paul is a 52 y.o. female.    Chief Complaint: Abdominal Pain    Abdominal Pain  This is a new problem. The current episode started 1 to 4 weeks ago. The onset quality is gradual. The problem occurs constantly. The problem has been gradually worsening. Pain location: lower abdomen. Quality: constant dull pain with stabbing pan at times. The abdominal pain radiates to the RLQ and suprapubic region. Pertinent negatives include no constipation, diarrhea, dysuria, fever, frequency, hematochezia or hematuria. Nothing aggravates the pain. The pain is relieved by Nothing.   Review of Systems   Constitutional:  Negative for fever.   Gastrointestinal:  Positive for abdominal pain. Negative for blood in stool, change in bowel habit, constipation, diarrhea, hematochezia, fecal incontinence and change in bowel habit.        Initially stools were hard   Genitourinary:  Positive for bladder incontinence (but not new). Negative for dysuria, flank pain, frequency and hematuria.        Hurts when sitting down to urinate, but not urinating itself       Objective:      Physical Exam  Constitutional:       Appearance: She is ill-appearing. She is not diaphoretic.   HENT:      Head: Normocephalic and atraumatic.   Cardiovascular:      Rate and Rhythm: Normal rate and regular rhythm.      Pulses: Normal pulses.   Pulmonary:      Effort: Pulmonary effort is normal. No respiratory distress.      Breath sounds: Normal breath sounds. No wheezing or rales.   Abdominal:      General: Abdomen is flat. Bowel sounds are normal.      Palpations: Abdomen is soft.      Tenderness: There is abdominal tenderness in the right lower quadrant, periumbilical area and suprapubic area. There is rebound. There is no right CVA tenderness or left CVA tenderness.      Hernia: No hernia is present.   Musculoskeletal:      Cervical back: Normal range of motion.   Neurological:      Mental Status: She is alert.        Assessment:       Problem List Items Addressed This Visit          Immunology/Multi System    Heliotrope eyelid rash    Relevant Orders    Ambulatory referral/consult to Rheumatology     Other Visit Diagnoses       Acute abdominal pain    -  Primary    Relevant Medications    dicyclomine (BENTYL) 10 MG capsule    Other Relevant Orders    CT Abdomen Pelvis With Contrast (Completed)    Comprehensive Metabolic Panel    CBC Auto Differential    Urinalysis, Reflex to Urine Culture Urine, Clean Catch            Plan:       Genie was seen today for abdominal pain.    Diagnoses and all orders for this visit:    Acute abdominal pain  -     CT Abdomen Pelvis With Contrast; Future  -     Comprehensive Metabolic Panel; Future  -     CBC Auto Differential; Future  -     Urinalysis, Reflex to Urine Culture Urine, Clean Catch; Future  -     dicyclomine (BENTYL) 10 MG capsule; Take 1 capsule (10 mg total) by mouth 4 (four) times daily before meals and nightly.  Will get CT for further evaluation. Need to rule out infectious/inflammatory process.   Rule out urine infection.  Will do trial of Bentyl in case pain related to colon spasms    Heliotrope eyelid rash  -     Ambulatory referral/consult to Rheumatology; Future  Previously documented by dermatology.  Patient was supposed to see rheumatology but never got around to it. Agrees to see rheum

## 2023-02-09 ENCOUNTER — OFFICE VISIT (OUTPATIENT)
Dept: URGENT CARE | Facility: CLINIC | Age: 53
End: 2023-02-09
Payer: COMMERCIAL

## 2023-02-09 VITALS
OXYGEN SATURATION: 96 % | HEIGHT: 65 IN | SYSTOLIC BLOOD PRESSURE: 141 MMHG | BODY MASS INDEX: 22.49 KG/M2 | HEART RATE: 72 BPM | TEMPERATURE: 97 F | WEIGHT: 135 LBS | RESPIRATION RATE: 18 BRPM | DIASTOLIC BLOOD PRESSURE: 80 MMHG

## 2023-02-09 DIAGNOSIS — F10.930 ALCOHOL WITHDRAWAL SYNDROME WITHOUT COMPLICATION: Primary | ICD-10-CM

## 2023-02-09 DIAGNOSIS — R00.2 INTERMITTENT PALPITATIONS: ICD-10-CM

## 2023-02-09 DIAGNOSIS — Z11.59 SCREENING FOR VIRAL DISEASE: ICD-10-CM

## 2023-02-09 LAB
CTP QC/QA: YES
SARS-COV-2 AG RESP QL IA.RAPID: NEGATIVE

## 2023-02-09 PROCEDURE — 93010 EKG 12-LEAD: ICD-10-PCS | Mod: S$GLB,,, | Performed by: INTERNAL MEDICINE

## 2023-02-09 PROCEDURE — 1160F RVW MEDS BY RX/DR IN RCRD: CPT | Mod: CPTII,S$GLB,, | Performed by: FAMILY MEDICINE

## 2023-02-09 PROCEDURE — 93005 ELECTROCARDIOGRAM TRACING: CPT | Mod: S$GLB,,, | Performed by: FAMILY MEDICINE

## 2023-02-09 PROCEDURE — 3079F PR MOST RECENT DIASTOLIC BLOOD PRESSURE 80-89 MM HG: ICD-10-PCS | Mod: CPTII,S$GLB,, | Performed by: FAMILY MEDICINE

## 2023-02-09 PROCEDURE — 3077F PR MOST RECENT SYSTOLIC BLOOD PRESSURE >= 140 MM HG: ICD-10-PCS | Mod: CPTII,S$GLB,, | Performed by: FAMILY MEDICINE

## 2023-02-09 PROCEDURE — 87811 SARS CORONAVIRUS 2 ANTIGEN POCT, MANUAL READ: ICD-10-PCS | Mod: QW,S$GLB,, | Performed by: FAMILY MEDICINE

## 2023-02-09 PROCEDURE — 3079F DIAST BP 80-89 MM HG: CPT | Mod: CPTII,S$GLB,, | Performed by: FAMILY MEDICINE

## 2023-02-09 PROCEDURE — 93010 ELECTROCARDIOGRAM REPORT: CPT | Mod: S$GLB,,, | Performed by: INTERNAL MEDICINE

## 2023-02-09 PROCEDURE — 3008F PR BODY MASS INDEX (BMI) DOCUMENTED: ICD-10-PCS | Mod: CPTII,S$GLB,, | Performed by: FAMILY MEDICINE

## 2023-02-09 PROCEDURE — 1159F MED LIST DOCD IN RCRD: CPT | Mod: CPTII,S$GLB,, | Performed by: FAMILY MEDICINE

## 2023-02-09 PROCEDURE — 3008F BODY MASS INDEX DOCD: CPT | Mod: CPTII,S$GLB,, | Performed by: FAMILY MEDICINE

## 2023-02-09 PROCEDURE — 99215 OFFICE O/P EST HI 40 MIN: CPT | Mod: S$GLB,,, | Performed by: FAMILY MEDICINE

## 2023-02-09 PROCEDURE — 99215 PR OFFICE/OUTPT VISIT, EST, LEVL V, 40-54 MIN: ICD-10-PCS | Mod: S$GLB,,, | Performed by: FAMILY MEDICINE

## 2023-02-09 PROCEDURE — 3077F SYST BP >= 140 MM HG: CPT | Mod: CPTII,S$GLB,, | Performed by: FAMILY MEDICINE

## 2023-02-09 PROCEDURE — 93005 EKG 12-LEAD: ICD-10-PCS | Mod: S$GLB,,, | Performed by: FAMILY MEDICINE

## 2023-02-09 PROCEDURE — 1160F PR REVIEW ALL MEDS BY PRESCRIBER/CLIN PHARMACIST DOCUMENTED: ICD-10-PCS | Mod: CPTII,S$GLB,, | Performed by: FAMILY MEDICINE

## 2023-02-09 PROCEDURE — 87811 SARS-COV-2 COVID19 W/OPTIC: CPT | Mod: QW,S$GLB,, | Performed by: FAMILY MEDICINE

## 2023-02-09 PROCEDURE — 1159F PR MEDICATION LIST DOCUMENTED IN MEDICAL RECORD: ICD-10-PCS | Mod: CPTII,S$GLB,, | Performed by: FAMILY MEDICINE

## 2023-02-09 RX ORDER — LORAZEPAM 0.5 MG/1
0.5 TABLET ORAL EVERY 4 HOURS PRN
Qty: 15 TABLET | Refills: 0 | Status: SHIPPED | OUTPATIENT
Start: 2023-02-09 | End: 2023-02-20

## 2023-02-09 NOTE — PROGRESS NOTES
"Subjective:       Patient ID: Genie Paul is a 52 y.o. female.    Vitals:  height is 5' 5" (1.651 m) and weight is 61.2 kg (135 lb). Her temperature is 96.8 °F (36 °C). Her blood pressure is 141/80 (abnormal) and her pulse is 72. Her respiration is 18 and oxygen saturation is 96%.     Chief Complaint: Other Misc (High blood pressure, light-headedness, tingling arms, sinus pressure, recent Covid exposure. - Entered by patient)    52-year-old female with complaints of tremulousness, some dizziness, intermittent sensation of heart racing and fatigue.  She also reports a "weird feeling in my chest but its not pain".  Denies chest pain or shortness of breath.  Dizziness is not worse with head movements.  Exposed to Covid on Sunday.  No history of hypertension.    In reviewing chart, she was seen in the emergency room in October 2022 with a similar, although more severe episode, related to alcohol withdrawal.  She concedes that the symptoms are similar, although less severe than previous.  Her baseline can be about 1 bottle of wine per night.  She drank less last night than usual.  No history of alcohol related seizures or DTs.  No PCP that she has connected with yet.    Dizziness:   Onset:  In the past 7 days  Pain Scale:  0/10  Dizziness characteristics:  Off-balance  Aggravated by:  Nothing  Treatments tried:  Nothing    Neurological:  Positive for dizziness.     Objective:      Physical Exam   Constitutional: She is oriented to person, place, and time. She appears well-developed. She is cooperative.  Non-toxic appearance. She does not appear ill. No distress.      Comments:Appears somewhat anxious     HENT:   Head: Normocephalic and atraumatic.   Ears:   Right Ear: Hearing, tympanic membrane, external ear and ear canal normal.   Left Ear: Hearing, tympanic membrane, external ear and ear canal normal.   Nose: Nose normal. No mucosal edema, rhinorrhea or nasal deformity. No epistaxis. Right sinus exhibits no " maxillary sinus tenderness and no frontal sinus tenderness. Left sinus exhibits no maxillary sinus tenderness and no frontal sinus tenderness.   Mouth/Throat: Uvula is midline, oropharynx is clear and moist and mucous membranes are normal. No trismus in the jaw. Normal dentition. No uvula swelling. No oropharyngeal exudate, posterior oropharyngeal edema or posterior oropharyngeal erythema.   Eyes: Conjunctivae and lids are normal. No scleral icterus.   Neck: Phonation normal. Neck supple. No edema present. No erythema present. No neck rigidity present.   Cardiovascular: Normal rate, regular rhythm, normal heart sounds and normal pulses.   Pulmonary/Chest: Effort normal and breath sounds normal. No stridor. No respiratory distress. She has no decreased breath sounds. She has no wheezes. She has no rhonchi. She has no rales.   Abdominal: She exhibits no distension. Soft. There is no abdominal tenderness. There is no guarding.   Musculoskeletal: Normal range of motion.         General: No deformity. Normal range of motion.   Neurological: no focal deficit. She is alert and oriented to person, place, and time. She displays no weakness and normal reflexes. No cranial nerve deficit or sensory deficit. She exhibits normal muscle tone. Coordination and gait normal.      Comments: Fine tremor is noted   Skin: Skin is warm, dry, intact, not diaphoretic and not pale.   Psychiatric: Her speech is normal and behavior is normal. Judgment and thought content normal.      Comments: Straightforward (and states embarrassed)  in relating facts around her alcohol consumption.  Good eye contact   Nursing note and vitals reviewed.      EKG:  Normal sinus rhythm with sinus arrhythmia.  No ST or T-wave changes consistent with myocardial ischemia.  Assessment:       1. Alcohol withdrawal syndrome without complication    2. Intermittent palpitations          Plan:     -- she arrives with somewhat (although not severely) elevated BP and  tachycardia.  This improved over course of the visit, and her symptoms improved.    -- she agrees to alcohol cessation, and these symptoms seem to be a motivator to stop completely.  discussed what to expect with complete cessation.  Discussed using Ativan as needed to address withdrawal symptoms.  She also agrees to attend 10 AA meetings, including a meeting at 7:15. tonight.    Alcohol withdrawal syndrome without complication  -     Ambulatory referral/consult to Internal Medicine  -     LORazepam (ATIVAN) 0.5 MG tablet; Take 1 tablet (0.5 mg total) by mouth every 4 (four) hours as needed for Anxiety.  Dispense: 15 tablet; Refill: 0    Intermittent palpitations  -     IN OFFICE EKG 12-LEAD (to [place])  -     Ambulatory referral/consult to Internal Medicine    I REQUESTED AN EXPEDITED REFERRAL FOR YOU TO ESTABLISH WITH PRIMARY CARE.  THEY SHOULD BE CALLING YOU SOON.    WITH ANY WORSENING OF SYMPTOMS GO TO THE EMERGENCY ROOM.

## 2023-02-09 NOTE — PATIENT INSTRUCTIONS
I REQUESTED AN EXPEDITED REFERRAL FOR YOU TO ESTABLISH WITH PRIMARY CARE.  THEY SHOULD BE CALLING YOU SOON.    WITH ANY WORSENING OF SYMPTOMS GO TO THE EMERGENCY ROOM.  
IRREGULAR

## 2023-02-10 ENCOUNTER — TELEPHONE (OUTPATIENT)
Dept: PRIMARY CARE CLINIC | Facility: CLINIC | Age: 53
End: 2023-02-10

## 2023-02-10 ENCOUNTER — TELEPHONE (OUTPATIENT)
Dept: URGENT CARE | Facility: CLINIC | Age: 53
End: 2023-02-10
Payer: COMMERCIAL

## 2023-02-11 NOTE — TELEPHONE ENCOUNTER
I spoke with patient and she seems to be doing well.  She took 1 dose of Ativan last night.  She has felt relaxed today.  No further sensation of heart racing or jitters.  She attended an AA meeting virtually yesterday, and plans to attend one in person soon.  PCP follow-up in place

## 2023-02-16 PROBLEM — D25.1 INTRAMURAL, SUBMUCOUS, AND SUBSEROUS LEIOMYOMA OF UTERUS: Status: ACTIVE | Noted: 2023-02-16

## 2023-02-16 PROBLEM — Q61.02 MULTIPLE RENAL CYSTS: Status: ACTIVE | Noted: 2023-02-16

## 2023-02-16 PROBLEM — F10.20 UNCOMPLICATED ALCOHOL DEPENDENCE: Status: ACTIVE | Noted: 2023-02-16

## 2023-02-16 PROBLEM — D73.4 SPLENIC CYST: Status: ACTIVE | Noted: 2023-02-16

## 2023-02-16 PROBLEM — D25.2 INTRAMURAL, SUBMUCOUS, AND SUBSEROUS LEIOMYOMA OF UTERUS: Status: ACTIVE | Noted: 2023-02-16

## 2023-02-16 PROBLEM — D25.0 INTRAMURAL, SUBMUCOUS, AND SUBSEROUS LEIOMYOMA OF UTERUS: Status: ACTIVE | Noted: 2023-02-16

## 2023-02-16 RX ORDER — NALTREXONE HYDROCHLORIDE 50 MG/1
TABLET, FILM COATED ORAL
Qty: 30 TABLET | Refills: 0 | Status: CANCELLED | OUTPATIENT
Start: 2023-02-16 | End: 2023-03-18

## 2023-02-16 NOTE — PROGRESS NOTES
FAMILY MEDICINE  [facility]    Reason for visit:   Chief Complaint   Patient presents with    [facility]                SUBJECTIVE: Genie Paul is a 52 y.o. female  - with alcohol  dependency presents as a new patient to establish [facility] and and discussed alcohol use. Last PCP Dr. Kris Minor MD    Gynecology Dr. Leanne Enciso MD    Genie Paul reports that she is been suffering from vision issues as well as an on rash on her right eye.  She reports that her ophthalmologist has referred her to rheumatology.  She is scheduled to see Rheumatology in about 2 months.    She reports struggling with alcohol dependency.  She did start drinking after your urgent care visit for a period of time however she is drinking alcohol again.  She does not drink daily.  She does report that she uses it as a stress reliever.  It is starting to affect her work.  She is not discuss with any of her friends or family.  She feels irritable, anxious and depressed at times.  She is never been treated for anxiety or depression in the past.  Her brother had alcoholism as well.  Her other brother passed at the age of 36 from melanoma.    Since her last urgent care visit for delirium tremens she did start with alcohol anonymous.  She attends zoom meetings however has only made it to 2 and meetings.  She is never been to Psychiatry or 2 therapy in the past.  She would like to stop drinking.  She reports well taking Ativan she did not have any alcohol during time.  She does have bouts of extreme anxiety which caused her to drink.  She has tried to changed to nonalcoholic beverages,.       Review of Systems   All other systems reviewed and are negative.    HEALTH MAINTENANCE:   Health Maintenance   Topic Date Due    TETANUS VACCINE  Never done    Mammogram  11/22/2023    Lipid Panel  08/18/2027    Hepatitis C Screening  Completed     Health Maintenance Topics with due status: Not Due       Topic Last  "Completion Date    Colorectal Cancer Screening 07/21/2022    Lipid Panel 08/18/2022    Cervical Cancer Screening 11/01/2022    Mammogram 11/22/2022     Health Maintenance Due   Topic Date Due    Pneumococcal Vaccines (Age 0-64) (1 - PCV) Never done    TETANUS VACCINE  Never done    Shingles Vaccine (1 of 2) Never done       HISTORY:   History reviewed. No pertinent past medical history.    Past Surgical History:   Procedure Laterality Date    EYE SURGERY  not sure    laser surgery for holes       Family History   Problem Relation Age of Onset    Cancer Mother         uterine possible unsure    Autoimmune disease Mother     Hearing loss Father     Alcohol abuse Brother     Cancer Brother         melanoma    Early death Brother     Cancer Maternal Aunt     Cancer Maternal Uncle     Stroke Maternal Grandmother     Cancer Maternal Grandfather     Breast cancer Other     Colon cancer Neg Hx     Ovarian cancer Neg Hx        Social History     Tobacco Use    Smoking status: Never     Passive exposure: Never   Substance Use Topics    Alcohol use: Yes     Alcohol/week: 14.0 standard drinks     Types: 10 Glasses of wine, 4 Shots of liquor per week     Comment: Bottle of wine daily     Drug use: No       Social History     Social History Narrative    Single. Lives alone. 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 No children. Suffers from alcohol dependency.        ALLERGIES:   Review of patient's allergies indicates:  No Known Allergies    MEDS:   Current Outpatient Medications on File Prior to Visit   Medication Sig Dispense Refill Last Dose    [DISCONTINUED] LORazepam (ATIVAN) 0.5 MG tablet Take 1 tablet (0.5 mg total) by mouth every 4 (four) hours as needed for Anxiety. (Patient not taking: Reported on 2/20/2023) 15 tablet 0 Not Taking         Vital signs:   Vitals:    02/20/23 1208   BP: 122/88   Pulse: 91   SpO2: 100%   Weight: 59.7 kg (131 lb 9.8 oz)   Height: 5' 5" (1.651 m)     Body mass index is " 21.9 kg/m².    PHYSICAL EXAM:     Physical Exam  Vitals reviewed.   Constitutional:       General: She is not in acute distress.  HENT:      Head: Normocephalic and atraumatic.      Right Ear: Tympanic membrane and ear canal normal.      Left Ear: Tympanic membrane and ear canal normal.   Eyes:      General: No scleral icterus.     Conjunctiva/sclera: Conjunctivae normal.   Neck:      Thyroid: Thyroid mass (left) and thyromegaly present. No thyroid tenderness.      Vascular: No carotid bruit.   Cardiovascular:      Rate and Rhythm: Normal rate and regular rhythm.      Heart sounds: Normal heart sounds. No murmur heard.    No friction rub. No gallop.   Pulmonary:      Effort: Pulmonary effort is normal.      Breath sounds: Normal breath sounds. No wheezing, rhonchi or rales.   Abdominal:      General: Bowel sounds are normal. There is no distension.      Palpations: Abdomen is soft.      Tenderness: There is no abdominal tenderness.   Musculoskeletal:      Cervical back: Normal range of motion and neck supple.      Right lower leg: No edema.      Left lower leg: No edema.   Lymphadenopathy:      Cervical: No cervical adenopathy.   Skin:     General: Skin is warm.      Capillary Refill: Capillary refill takes less than 2 seconds.   Neurological:      Mental Status: She is alert.           PERTINENT RESULTS:   Office Visit on 02/09/2023   Component Date Value Ref Range Status    SARS Coronavirus 2 Antigen 02/09/2023 Negative  Negative Final     Acceptable 02/09/2023 Yes   Final   Lab Visit on 01/06/2023   Component Date Value Ref Range Status    Specimen UA 01/06/2023 Urine, Clean Catch   Final    Color, UA 01/06/2023 Yellow  Yellow, Straw, Clarissa Final    Appearance, UA 01/06/2023 Clear  Clear Final    pH, UA 01/06/2023 7.0  5.0 - 8.0 Final    Specific Gravity, UA 01/06/2023 1.010  1.005 - 1.030 Final    Protein, UA 01/06/2023 Negative  Negative Final    Comment: Recommend a 24 hour urine protein or a  urine   protein/creatinine ratio if globulin induced proteinuria is  clinically suspected.      Glucose, UA 01/06/2023 Negative  Negative Final    Ketones, UA 01/06/2023 Negative  Negative Final    Bilirubin (UA) 01/06/2023 Negative  Negative Final    Occult Blood UA 01/06/2023 1+ (A)  Negative Final    Nitrite, UA 01/06/2023 Negative  Negative Final    Leukocytes, UA 01/06/2023 3+ (A)  Negative Final    RBC, UA 01/06/2023 0  0 - 4 /hpf Final    WBC, UA 01/06/2023 6 (H)  0 - 5 /hpf Final    Bacteria 01/06/2023 Rare  None-Occ /hpf Final    Squam Epithel, UA 01/06/2023 2  /hpf Final    Microscopic Comment 01/06/2023 SEE COMMENT   Final    Comment: Other formed elements not mentioned in the report are not   present in the microscopic examination.      Lab Visit on 01/06/2023   Component Date Value Ref Range Status    Sodium 01/06/2023 135 (L)  136 - 145 mmol/L Final    Potassium 01/06/2023 4.5  3.5 - 5.1 mmol/L Final    Chloride 01/06/2023 103  95 - 110 mmol/L Final    CO2 01/06/2023 23  23 - 29 mmol/L Final    Glucose 01/06/2023 95  70 - 110 mg/dL Final    BUN 01/06/2023 8  6 - 20 mg/dL Final    Creatinine 01/06/2023 0.7  0.5 - 1.4 mg/dL Final    Calcium 01/06/2023 9.8  8.7 - 10.5 mg/dL Final    Total Protein 01/06/2023 7.3  6.0 - 8.4 g/dL Final    Albumin 01/06/2023 3.9  3.5 - 5.2 g/dL Final    Total Bilirubin 01/06/2023 0.7  0.1 - 1.0 mg/dL Final    Comment: For infants and newborns, interpretation of results should be based  on gestational age, weight and in agreement with clinical  observations.    Premature Infant recommended reference ranges:  Up to 24 hours.............<8.0 mg/dL  Up to 48 hours............<12.0 mg/dL  3-5 days..................<15.0 mg/dL  6-29 days.................<15.0 mg/dL      Alkaline Phosphatase 01/06/2023 49 (L)  55 - 135 U/L Final    AST 01/06/2023 15  10 - 40 U/L Final    ALT 01/06/2023 9 (L)  10 - 44 U/L Final    Anion Gap 01/06/2023 9  8 - 16 mmol/L Final    eGFR 01/06/2023 >60.0   >60 mL/min/1.73 m^2 Final    WBC 01/06/2023 8.27  3.90 - 12.70 K/uL Final    RBC 01/06/2023 4.71  4.00 - 5.40 M/uL Final    Hemoglobin 01/06/2023 14.4  12.0 - 16.0 g/dL Final    Hematocrit 01/06/2023 45.5  37.0 - 48.5 % Final    MCV 01/06/2023 97  82 - 98 fL Final    MCH 01/06/2023 30.6  27.0 - 31.0 pg Final    MCHC 01/06/2023 31.6 (L)  32.0 - 36.0 g/dL Final    RDW 01/06/2023 12.3  11.5 - 14.5 % Final    Platelets 01/06/2023 267  150 - 450 K/uL Final    MPV 01/06/2023 10.6  9.2 - 12.9 fL Final    Immature Granulocytes 01/06/2023 0.5  0.0 - 0.5 % Final    Gran # (ANC) 01/06/2023 4.5  1.8 - 7.7 K/uL Final    Immature Grans (Abs) 01/06/2023 0.04  0.00 - 0.04 K/uL Final    Comment: Mild elevation in immature granulocytes is non specific and   can be seen in a variety of conditions including stress response,   acute inflammation, trauma and pregnancy. Correlation with other   laboratory and clinical findings is essential.      Lymph # 01/06/2023 2.4  1.0 - 4.8 K/uL Final    Mono # 01/06/2023 0.8  0.3 - 1.0 K/uL Final    Eos # 01/06/2023 0.4  0.0 - 0.5 K/uL Final    Baso # 01/06/2023 0.09  0.00 - 0.20 K/uL Final    nRBC 01/06/2023 0  0 /100 WBC Final    Gran % 01/06/2023 54.7  38.0 - 73.0 % Final    Lymph % 01/06/2023 29.5  18.0 - 48.0 % Final    Mono % 01/06/2023 9.1  4.0 - 15.0 % Final    Eosinophil % 01/06/2023 5.1  0.0 - 8.0 % Final    Basophil % 01/06/2023 1.1  0.0 - 1.9 % Final    Differential Method 01/06/2023 Automated   Final   Office Visit on 11/01/2022   Component Date Value Ref Range Status    HPV DNA High Risk 11/01/2022 Not Detected  NOT DETECTED Final    Comment: Not Detected High Risk HPV types (16,18,31,33,35,39,45,51,52,  56,58,59,66,68) were not detected. Other HPV  types which cause anogenital lesions may be present.  The significance of the other types of HPV  in malignant processes has not been established.    Methodology: Real Time PCR                        TEST PERFORMED AT:  [facility]  [address] [place] [address]  ISAC JUNIOR MD,PHD      Cytology ThinPrep Pap Source 11/01/2022 Cervix   Final    Cytology ThinPrep Pap Report Status 11/01/2022 DNR   Final    Cytology Thinprep PAP Clinical His* 11/01/2022 Routine exam   Corrected    Cytology ThinPrep Pap LMP 11/01/2022 10/27/2022   Final    Cytology ThinPrep Previous PAP 11/01/2022 Unknown   Final    Cytology ThinPrep Previous Biopsy 11/01/2022 No   Final    Cytology ThinPrep PAP Adequacy 11/01/2022 SEE BELOW   Final    Comment: Satisfactory for evaluation. Endocervical/transformation zone   component   present.      Cytology ThinPrep PAP General Mayra* 11/01/2022 DNR   Final    Cytology ThinPrep PAP Interpretati* 11/01/2022 SEE BELOW   Final    Negative for intraepithelial lesion or malignancy.    Cytology ThinPrep PAP Comment 11/01/2022 SEE BELOW   Final    This Pap test has been evaluated with computer assisted technology.    Cytotechnologist 11/01/2022 ABS, CT(ASCP)   Final    Review Cytotechnologist 11/01/2022 DNR   Final    Pathologist 11/01/2022 DNR   Final    Cytology ThinPrep PAP Infection 11/01/2022 DNR   Final    Cytology Thin Prep Pap Explanation 11/01/2022 SEE BELOW   Final    Comment: EXPLANATORY NOTE:     The Pap is a screening test for cervical cancer. It is   not a diagnostic test and is subject to false negative   and false positive results. It is most reliable when a   satisfactory sample, regularly obtained, is submitted   with relevant clinical findings and history, and when   the Pap result is evaluated along with historic and   current clinical information.    TEST PERFORMED AT:  [facility] [place] [address]  KADY IRVING MD     Admission on 10/03/2022, Discharged on 10/03/2022   Component Date Value Ref Range Status    Beta-Hydroxybutyrate 10/03/2022 0.5  0.0 - 0.5 mmol/L Final    WBC 10/03/2022 8.08  3.90 - 12.70 K/uL Final     RBC 10/03/2022 4.84  4.00 - 5.40 M/uL Final    Hemoglobin 10/03/2022 15.1  12.0 - 16.0 g/dL Final    Hematocrit 10/03/2022 45.0  37.0 - 48.5 % Final    MCV 10/03/2022 93  82 - 98 fL Final    MCH 10/03/2022 31.2 (H)  27.0 - 31.0 pg Final    MCHC 10/03/2022 33.6  32.0 - 36.0 g/dL Final    RDW 10/03/2022 13.5  11.5 - 14.5 % Final    Platelets 10/03/2022 268  150 - 450 K/uL Final    MPV 10/03/2022 9.8  9.2 - 12.9 fL Final    Immature Granulocytes 10/03/2022 0.2  0.0 - 0.5 % Final    Gran # (ANC) 10/03/2022 5.5  1.8 - 7.7 K/uL Final    Immature Grans (Abs) 10/03/2022 0.02  0.00 - 0.04 K/uL Final    Comment: Mild elevation in immature granulocytes is non specific and   can be seen in a variety of conditions including stress response,   acute inflammation, trauma and pregnancy. Correlation with other   laboratory and clinical findings is essential.      Lymph # 10/03/2022 1.9  1.0 - 4.8 K/uL Final    Mono # 10/03/2022 0.4  0.3 - 1.0 K/uL Final    Eos # 10/03/2022 0.2  0.0 - 0.5 K/uL Final    Baso # 10/03/2022 0.08  0.00 - 0.20 K/uL Final    nRBC 10/03/2022 0  0 /100 WBC Final    Gran % 10/03/2022 68.2  38.0 - 73.0 % Final    Lymph % 10/03/2022 23.4  18.0 - 48.0 % Final    Mono % 10/03/2022 5.0  4.0 - 15.0 % Final    Eosinophil % 10/03/2022 2.2  0.0 - 8.0 % Final    Basophil % 10/03/2022 1.0  0.0 - 1.9 % Final    Differential Method 10/03/2022 Automated   Final    Sodium 10/03/2022 137  136 - 145 mmol/L Final    Potassium 10/03/2022 4.0  3.5 - 5.1 mmol/L Final    Chloride 10/03/2022 105  95 - 110 mmol/L Final    CO2 10/03/2022 19 (L)  23 - 29 mmol/L Final    Glucose 10/03/2022 89  70 - 110 mg/dL Final    BUN 10/03/2022 6  6 - 20 mg/dL Final    Creatinine 10/03/2022 0.7  0.5 - 1.4 mg/dL Final    Calcium 10/03/2022 9.6  8.7 - 10.5 mg/dL Final    Total Protein 10/03/2022 7.6  6.0 - 8.4 g/dL Final    Albumin 10/03/2022 4.2  3.5 - 5.2 g/dL Final    Total Bilirubin 10/03/2022 0.5  0.1 - 1.0 mg/dL Final    Comment: For infants and  newborns, interpretation of results should be based  on gestational age, weight and in agreement with clinical  observations.    Premature Infant recommended reference ranges:  Up to 24 hours.............<8.0 mg/dL  Up to 48 hours............<12.0 mg/dL  3-5 days..................<15.0 mg/dL  6-29 days.................<15.0 mg/dL      Alkaline Phosphatase 10/03/2022 49 (L)  55 - 135 U/L Final    AST 10/03/2022 21  10 - 40 U/L Final    ALT 10/03/2022 16  10 - 44 U/L Final    Anion Gap 10/03/2022 13  8 - 16 mmol/L Final    eGFR 10/03/2022 >60  >60 mL/min/1.73 m^2 Final    Lipase 10/03/2022 17  4 - 60 U/L Final    Vitamin B-12 10/03/2022 878  210 - 950 pg/mL Final    Magnesium 10/03/2022 2.1  1.6 - 2.6 mg/dL Final    HIV 1/2 Ag/Ab 10/03/2022 Negative  Negative Final    Hepatitis C Ab 10/03/2022 Negative  Negative Final    Alcohol, Serum 10/03/2022 <10  <10 mg/dL Final    POCT Glucose 10/03/2022 77  70 - 110 mg/dL Final     CT Abdomen Pelvis With Contrast  Narrative: EXAMINATION:  CT ABDOMEN PELVIS WITH CONTRAST    CLINICAL HISTORY:  RLQ abdominal pain (Age >= 14y);  Unspecified abdominal pain    FINDINGS:  Patient was administered 75 cc of Omnipaque 350 intravenously.  The lung bases are clear.  The liver, gallbladder, biliary tree show nothing unusual.  There is a small splenic lesion most likely a cyst, epidermoid, or hemangioma.  This measures a cm and is non worrisome.  The pancreas and duodenum show nothing unusual.  The adrenal glands are not enlarged.  There are 2 tiny left renal cyst.  Kidneys enhance normally.  The vessels enhance normally.  No para-aortic, retroperitoneal, or mesenteric adenopathy is seen.  No obstruction, ileus, perforation, or ascites is seen.  The appendix is normal.  Uterus demonstrates large fibroids.  Overall uterine size is 13 by 10 by 13 cm.  There are 2 colliding 7 cm fibroids and a 4 cm fibroid.  There is normal appearance of the ovaries.  Bowel demonstrates no obstruction, ileus,  or perforation.  The bones are unremarkable.  Impression: Multiple uterine leiomyomas as above.    Two tiny left renal cyst.    Splenic lesion measuring a cm either a cyst, epidermoid, or small hemangioma.    Electronically signed by: Luis Daniel Chahal MD  Date:    01/06/2023  Time:    15:36    11/25/2022 Routine     Physician Responsible for MQSA Outcome Reason    Neelam Joseph MD Signed      Narrative & Impression  Result:  Mammo Digital Screening Bilat w/ Sanjay     History:  Patient is 52 y.o. and is seen for a screening mammogram.        Films Compared:  Prior images (if available) were compared.      Findings:   This procedure was performed using tomosynthesis.   Computer-aided detection was utilized in the interpretation of this examination.     The breasts are heterogeneously dense, which may obscure small masses. There is no evidence of suspicious masses, microcalcifications or architectural distortion.     Impression:   No mammographic evidence of malignancy.     BI-RADS Category 1: Negative     Recommendation:  Routine screening mammogram in 1 year is recommended.     Your estimated lifetime risk of breast cancer (to age 85) based on Tyrer-Cuzick risk assessment model is 9.21 %.  According to the American Cancer Society, patients with a lifetime breast cancer risk of 20% or higher might benefit from supplemental screening tests. ??                     Exam Ended: 11/22/22 11:22 Last Resulted: 11/25/22 10:43             ASSESSMENT/PLAN:    1. Generalized anxiety disorder  Overview:  - see depression    Orders:  -     FLUoxetine 10 MG capsule; Take 1 capsule (10 mg total) by mouth once daily.  Dispense: 30 capsule; Refill: 1  -     busPIRone (BUSPAR) 5 MG Tab; Take 1 tablet (5 mg total) by mouth 2 (two) times daily as needed (anxiety).  Dispense: 30 tablet; Refill: 0    2. Current moderate episode of major depressive disorder without prior episode  Overview:  - counseling on depression with anxiety,  management and treatment options  - recommend counseling  - recommend start Fluoxetine 10 mg daily and buspirone 5 mg as needed  - counseling regarding new medication including expected results, potential side effects, and appropriate use. Questions elicited and answered  - encouraged to patient to notify me of any questions or concerns      3. Alcohol dependence with alcohol-induced mood disorder  Overview:  - encourage to continue with AA meetings      4. Asymptomatic microscopic hematuria  Overview:  - in setting of UTI  - monitor    Orders:  -     Urinalysis, Reflex to Urine Culture Urine, Clean Catch; Future; Expected date: 10/20/2023    5. Thyroid nodule  Overview:  - noted on exam left  - recommend thyroid US  Lab Results   Component Value Date    TSH 2.265 07/09/2021    B9IUFEQ 7.7 07/09/2021         Orders:  -     US Soft Tissue Head Neck Thyroid; Future; Expected date: 02/20/2023    6. Multiple renal cysts  Overview:  - 1/6/23 CT abd/pelvis: 2 very small left renal cysts      7. Intramural, submucous, and subserous leiomyoma of uterus  Overview:  - 01/06/2023 CT abdomen pelvis:  Uterus demonstrates large fibroids.  Overall uterine size is 13 x 10 x 13 cm.  There to colliding 7 cm fibroids and a 4 cm fibroid.  Ovaries normal.  - reports normal menses      8. Splenic cyst  Overview:  - 01/06/2023 CT abdomen pelvis:  Splenic lesion measuring 1 cm that is non worrisome.  Likely cysts, epidermoid or hemangioma      9. Screening for metabolic disorder  -     Comprehensive Metabolic Panel; Future; Expected date: 10/20/2023    10. Encounter for lipid screening for cardiovascular disease  -     Lipid Panel; Future; Expected date: 10/20/2023    11. Screening, iron deficiency anemia  -     CBC Without Differential; Future; Expected date: 10/20/2023    12. Screening for diabetes mellitus (DM)  -     Hemoglobin A1C; Future; Expected date: 10/20/2023    13. Screening for thyroid disorder  -     TSH; Future; Expected  date: 10/20/2023    Other orders  -     Cancel: Tdap Vaccine; Future; Expected date: 02/20/2023  -     varicella-zoster [id number], PF, (SHINGRIX, PF,) 50 mcg/0.5 mL injection; Inject 0.5mL IM at 0 and 2-6 months  Dispense: 1 each; Refill: 1          ORDERS:   Orders Placed This Encounter    US Soft Tissue Head Neck Thyroid    CBC Without Differential    Comprehensive Metabolic Panel    Lipid Panel    Hemoglobin A1C    TSH    Urinalysis, Reflex to Urine Culture Urine, Clean Catch    varicella-zoster [id number], PF, (SHINGRIX, PF,) 50 mcg/0.5 mL injection    FLUoxetine 10 MG capsule    busPIRone (BUSPAR) 5 MG Tab       Vaccines recommended: Tdap and Shingles    Follow-up in 1 month thyroid US and anxiety and 8 months for annual labs or sooner if any concerns.      This note is dictated using the M*Modal Fluency Direct word recognition program. There are word recognition mistakes that are occasionally missed on review.    Dr. Marva Lama D.O.   Family Medicine

## 2023-02-20 ENCOUNTER — HOSPITAL ENCOUNTER (OUTPATIENT)
Dept: RADIOLOGY | Facility: OTHER | Age: 53
Discharge: HOME | End: 2023-02-20
Attending: FAMILY MEDICINE
Payer: COMMERCIAL

## 2023-02-20 ENCOUNTER — OFFICE VISIT (OUTPATIENT)
Dept: PRIMARY CARE CLINIC | Facility: CLINIC | Age: 53
End: 2023-02-20
Attending: FAMILY MEDICINE
Payer: COMMERCIAL

## 2023-02-20 VITALS
WEIGHT: 131.63 LBS | HEIGHT: 65 IN | DIASTOLIC BLOOD PRESSURE: 88 MMHG | BODY MASS INDEX: 21.93 KG/M2 | HEART RATE: 91 BPM | OXYGEN SATURATION: 100 % | SYSTOLIC BLOOD PRESSURE: 122 MMHG

## 2023-02-20 DIAGNOSIS — F32.1 CURRENT MODERATE EPISODE OF MAJOR DEPRESSIVE DISORDER WITHOUT PRIOR EPISODE: ICD-10-CM

## 2023-02-20 DIAGNOSIS — F41.1 GENERALIZED ANXIETY DISORDER: Primary | ICD-10-CM

## 2023-02-20 DIAGNOSIS — Z13.0 SCREENING, IRON DEFICIENCY ANEMIA: ICD-10-CM

## 2023-02-20 DIAGNOSIS — Z13.6 ENCOUNTER FOR LIPID SCREENING FOR CARDIOVASCULAR DISEASE: ICD-10-CM

## 2023-02-20 DIAGNOSIS — D25.0 INTRAMURAL, SUBMUCOUS, AND SUBSEROUS LEIOMYOMA OF UTERUS: ICD-10-CM

## 2023-02-20 DIAGNOSIS — E04.1 THYROID NODULE: ICD-10-CM

## 2023-02-20 DIAGNOSIS — Z13.220 ENCOUNTER FOR LIPID SCREENING FOR CARDIOVASCULAR DISEASE: ICD-10-CM

## 2023-02-20 DIAGNOSIS — D25.1 INTRAMURAL, SUBMUCOUS, AND SUBSEROUS LEIOMYOMA OF UTERUS: ICD-10-CM

## 2023-02-20 DIAGNOSIS — Z13.1 SCREENING FOR DIABETES MELLITUS (DM): ICD-10-CM

## 2023-02-20 DIAGNOSIS — D73.4 SPLENIC CYST: ICD-10-CM

## 2023-02-20 DIAGNOSIS — D25.2 INTRAMURAL, SUBMUCOUS, AND SUBSEROUS LEIOMYOMA OF UTERUS: ICD-10-CM

## 2023-02-20 DIAGNOSIS — F10.24 ALCOHOL DEPENDENCE WITH ALCOHOL-INDUCED MOOD DISORDER: ICD-10-CM

## 2023-02-20 DIAGNOSIS — Z13.228 SCREENING FOR METABOLIC DISORDER: ICD-10-CM

## 2023-02-20 DIAGNOSIS — Z13.29 SCREENING FOR THYROID DISORDER: ICD-10-CM

## 2023-02-20 DIAGNOSIS — R31.21 ASYMPTOMATIC MICROSCOPIC HEMATURIA: ICD-10-CM

## 2023-02-20 DIAGNOSIS — Q61.02 MULTIPLE RENAL CYSTS: ICD-10-CM

## 2023-02-20 PROCEDURE — 3079F PR MOST RECENT DIASTOLIC BLOOD PRESSURE 80-89 MM HG: ICD-10-PCS | Mod: CPTII,S$GLB,, | Performed by: FAMILY MEDICINE

## 2023-02-20 PROCEDURE — 3074F PR MOST RECENT SYSTOLIC BLOOD PRESSURE < 130 MM HG: ICD-10-PCS | Mod: CPTII,S$GLB,, | Performed by: FAMILY MEDICINE

## 2023-02-20 PROCEDURE — 3079F DIAST BP 80-89 MM HG: CPT | Mod: CPTII,S$GLB,, | Performed by: FAMILY MEDICINE

## 2023-02-20 PROCEDURE — 76536 US EXAM OF HEAD AND NECK: CPT | Mod: 26,,, | Performed by: RADIOLOGY

## 2023-02-20 PROCEDURE — 3074F SYST BP LT 130 MM HG: CPT | Mod: CPTII,S$GLB,, | Performed by: FAMILY MEDICINE

## 2023-02-20 PROCEDURE — 99999 PR PBB SHADOW E&M-EST. PATIENT-LVL IV: CPT | Mod: PBBFAC,,, | Performed by: FAMILY MEDICINE

## 2023-02-20 PROCEDURE — 1159F MED LIST DOCD IN RCRD: CPT | Mod: CPTII,S$GLB,, | Performed by: FAMILY MEDICINE

## 2023-02-20 PROCEDURE — 1160F PR REVIEW ALL MEDS BY PRESCRIBER/CLIN PHARMACIST DOCUMENTED: ICD-10-PCS | Mod: CPTII,S$GLB,, | Performed by: FAMILY MEDICINE

## 2023-02-20 PROCEDURE — 3008F PR BODY MASS INDEX (BMI) DOCUMENTED: ICD-10-PCS | Mod: CPTII,S$GLB,, | Performed by: FAMILY MEDICINE

## 2023-02-20 PROCEDURE — 3008F BODY MASS INDEX DOCD: CPT | Mod: CPTII,S$GLB,, | Performed by: FAMILY MEDICINE

## 2023-02-20 PROCEDURE — 99999 PR PBB SHADOW E&M-EST. PATIENT-LVL IV: ICD-10-PCS | Mod: PBBFAC,,, | Performed by: FAMILY MEDICINE

## 2023-02-20 PROCEDURE — 1159F PR MEDICATION LIST DOCUMENTED IN MEDICAL RECORD: ICD-10-PCS | Mod: CPTII,S$GLB,, | Performed by: FAMILY MEDICINE

## 2023-02-20 PROCEDURE — 99214 OFFICE O/P EST MOD 30 MIN: CPT | Mod: S$GLB,,, | Performed by: FAMILY MEDICINE

## 2023-02-20 PROCEDURE — 76536 US EXAM OF HEAD AND NECK: CPT | Mod: TC

## 2023-02-20 PROCEDURE — 1160F RVW MEDS BY RX/DR IN RCRD: CPT | Mod: CPTII,S$GLB,, | Performed by: FAMILY MEDICINE

## 2023-02-20 PROCEDURE — 76536 US SOFT TISSUE HEAD NECK THYROID: ICD-10-PCS | Mod: 26,,, | Performed by: RADIOLOGY

## 2023-02-20 PROCEDURE — 99214 PR OFFICE/OUTPT VISIT, EST, LEVL IV, 30-39 MIN: ICD-10-PCS | Mod: S$GLB,,, | Performed by: FAMILY MEDICINE

## 2023-02-20 RX ORDER — FLUOXETINE 10 MG/1
10 CAPSULE ORAL DAILY
Qty: 30 CAPSULE | Refills: 1 | Status: SHIPPED | OUTPATIENT
Start: 2023-02-20 | End: 2023-03-14

## 2023-02-20 RX ORDER — ZOSTER VACCINE RECOMBINANT, ADJUVANTED 50 MCG/0.5
KIT INTRAMUSCULAR
Qty: 1 EACH | Refills: 1 | Status: SHIPPED | OUTPATIENT
Start: 2023-02-20

## 2023-02-20 RX ORDER — BUSPIRONE HYDROCHLORIDE 5 MG/1
5 TABLET ORAL 2 TIMES DAILY PRN
Qty: 30 TABLET | Refills: 0 | Status: SHIPPED | OUTPATIENT
Start: 2023-02-20 | End: 2023-02-27

## 2023-03-15 ENCOUNTER — PATIENT OUTREACH (OUTPATIENT)
Dept: ADMINISTRATIVE | Facility: HOSPITAL | Age: 53
End: 2023-03-15
Payer: COMMERCIAL

## 2023-03-20 ENCOUNTER — OFFICE VISIT (OUTPATIENT)
Dept: DERMATOLOGY | Facility: CLINIC | Age: 53
End: 2023-03-20
Payer: COMMERCIAL

## 2023-03-20 DIAGNOSIS — L30.9 DERMATITIS: Primary | ICD-10-CM

## 2023-03-20 DIAGNOSIS — F41.1 GENERALIZED ANXIETY DISORDER: ICD-10-CM

## 2023-03-20 PROCEDURE — 1159F MED LIST DOCD IN RCRD: CPT | Mod: CPTII,95,, | Performed by: PHYSICIAN ASSISTANT

## 2023-03-20 PROCEDURE — 1160F RVW MEDS BY RX/DR IN RCRD: CPT | Mod: CPTII,95,, | Performed by: PHYSICIAN ASSISTANT

## 2023-03-20 PROCEDURE — 99214 OFFICE O/P EST MOD 30 MIN: CPT | Mod: 95,,, | Performed by: PHYSICIAN ASSISTANT

## 2023-03-20 PROCEDURE — 1160F PR REVIEW ALL MEDS BY PRESCRIBER/CLIN PHARMACIST DOCUMENTED: ICD-10-PCS | Mod: CPTII,95,, | Performed by: PHYSICIAN ASSISTANT

## 2023-03-20 PROCEDURE — 1159F PR MEDICATION LIST DOCUMENTED IN MEDICAL RECORD: ICD-10-PCS | Mod: CPTII,95,, | Performed by: PHYSICIAN ASSISTANT

## 2023-03-20 PROCEDURE — 99214 PR OFFICE/OUTPT VISIT, EST, LEVL IV, 30-39 MIN: ICD-10-PCS | Mod: 95,,, | Performed by: PHYSICIAN ASSISTANT

## 2023-03-20 RX ORDER — DESONIDE 0.5 MG/G
OINTMENT TOPICAL 2 TIMES DAILY
Qty: 15 G | Refills: 0 | Status: SHIPPED | OUTPATIENT
Start: 2023-03-20 | End: 2023-10-17

## 2023-03-20 RX ORDER — MUPIROCIN 20 MG/G
OINTMENT TOPICAL 3 TIMES DAILY
Qty: 22 G | Refills: 0 | Status: SHIPPED | OUTPATIENT
Start: 2023-03-20 | End: 2023-10-17

## 2023-03-20 NOTE — PROGRESS NOTES
Subjective:       Patient ID:  Genie Paul is a 52 y.o. female who presents for No chief complaint on file.    The patient location is: [place], [place]  The chief complaint leading to consultation is: rash    Visit type: audiovisual    Face to Face time with patient: 15 minutes  20 minutes of total time spent on the encounter, which includes face to face time and non-face to face time preparing to see the patient (eg, review of tests), Obtaining and/or reviewing separately obtained history, Documenting clinical information in the electronic or other health record, Independently interpreting results (not separately reported) and communicating results to the patient/family/caregiver, or Care coordination (not separately reported).         Each patient to whom he or she provides medical services by telemedicine is:  (1) informed of the relationship between the physician and patient and the respective role of any other health care provider with respect to management of the patient; and (2) notified that he or she may decline to receive medical services by telemedicine and may withdraw from such care at any time.    Notes:   C/o rash of right eyelid, initial onset in 2020 with intermittent recurrence since that time. +Redness, dryness, swelling, whelp like bumps (resolved), itching. Endorses h/o honey colored crusting and oozing. She believes it may be spreading to the other eye. This most recent flare for about 1 month now. She does endorse using a new face wash prior to onset, but has since stopped it. Denies any new meds.     Was seen in 2021 by Dr. Christensen for dermatitis of hands and eyelids with concern for hand eczema and a heliotrope rash. Recommended to see rheum. Pt never got LINDA labs. She has upcoming rheum evaluation in May (states she had a hard time getting an appointment). Denies other rashes, scalp rashes, nail changes, or muscle weakness. Denies agatha photosensitivity.     Previous tx: otc hc,  aloe, cetaphil lotion    Denies h/o eczema. States she had allergy testing many years ago (prick testing).    Labs reviewed from 7/9/21:   Sed rate and RF wnl   CCP antibodies wnl  CRP wnl      Review of Systems   Constitutional:  Negative for fever and chills.   Gastrointestinal:  Negative for nausea and vomiting.   Skin:  Positive for itching, rash, dry skin and activity-related sunscreen use. Negative for sun sensitivity, daily sunscreen use, recent sunburn, dry lips and abscesses.   Hematologic/Lymphatic: Does not bruise/bleed easily.      Objective:    Physical Exam   Constitutional: She appears well-developed and well-nourished. No distress.   Neurological: She is alert and oriented to person, place, and time. She is not disoriented.   Psychiatric: She has a normal mood and affect.   Skin:   Areas Examined (abnormalities noted in diagram):   Head / Face Inspection Performed  Neck Inspection Performed  Chest / Axilla Inspection Performed  Back Inspection Performed  RUE Inspected  LUE Inspection Performed            Diagram Legend     Erythematous scaling macule/papule c/w actinic keratosis       Vascular papule c/w angioma      Pigmented verrucoid papule/plaque c/w seborrheic keratosis      Yellow umbilicated papule c/w sebaceous hyperplasia      Irregularly shaped tan macule c/w lentigo     1-2 mm smooth white papules consistent with Milia      Movable subcutaneous cyst with punctum c/w epidermal inclusion cyst      Subcutaneous movable cyst c/w pilar cyst      Firm pink to brown papule c/w dermatofibroma      Pedunculated fleshy papule(s) c/w skin tag(s)      Evenly pigmented macule c/w junctional nevus     Mildly variegated pigmented, slightly irregular-bordered macule c/w mildly atypical nevus      Flesh colored to evenly pigmented papule c/w intradermal nevus       Pink pearly papule/plaque c/w basal cell carcinoma      Erythematous hyperkeratotic cursted plaque c/w SCC      Surgical scar with no sign of  skin cancer recurrence      Open and closed comedones      Inflammatory papules and pustules      Verrucoid papule consistent consistent with wart     Erythematous eczematous patches and plaques     Dystrophic onycholytic nail with subungual debris c/w onychomycosis     Umbilicated papule    Erythematous-base heme-crusted tan verrucoid plaque consistent with inflamed seborrheic keratosis     Erythematous Silvery Scaling Plaque c/w Psoriasis     See annotation      Assessment / Plan:        Dermatitis  -     mupirocin (BACTROBAN) 2 % ointment; Apply topically 3 (three) times daily. AAA bid prn rash of eyelid.  Dispense: 22 g; Refill: 0  -     desonide 0.05% (DESOWEN) 0.05 % Oint; Apply topically 2 (two) times daily.  Dispense: 15 g; Refill: 0  -     LINDA Screen w/Reflex; Future; Expected date: 03/20/2023  Ddx: eczematous vs. Potential secondary infection vs. Photosensitivity vs. Heliotrope vs. Other  Trial of above, contact precautions, strict fragrance free regimen. Recommend LINDA screen. Advised to keep rheum appointment. Advised to schedule derm f/u with Dr. Christensen in [facility].            Follow up in about 2 months (around 5/20/2023) for to see Dermatology MD.

## 2023-03-21 RX ORDER — BUSPIRONE HYDROCHLORIDE 5 MG/1
5 TABLET ORAL 2 TIMES DAILY PRN
Qty: 180 TABLET | Refills: 0 | Status: SHIPPED | OUTPATIENT
Start: 2023-03-21 | End: 2023-10-17

## 2023-03-21 NOTE — TELEPHONE ENCOUNTER
Refill Encounter    PCP Visits: Recent Visits  Date Type Provider Dept   02/20/23 Office Visit Marva Lama, DO [facility]   Showing recent visits within past 360 days and meeting all other requirements  Future Appointments  Date Type Provider Dept   05/02/23 Appointment Marva Lama,  [facility]   10/23/23 Appointment Marva Lama, DO [facility]   Showing future appointments within next 720 days and meeting all other requirements     Last 3 Blood Pressure:   BP Readings from Last 3 Encounters:   02/20/23 122/88   02/09/23 (!) 141/80   01/06/23 110/78     Preferred Pharmacy:   [facility]/pharmacy #[id number] - [place], [place] - [address]  [address]  [place] [address]  Phone: [phone] Fax: [phone]    [facility]/pharmacy #[id number] - [place], [place] - [address]  [address]  [place] [address]  Phone: [phone] Fax: [phone]    Requested RX:  Requested Prescriptions     Pending Prescriptions Disp Refills    busPIRone (BUSPAR) 5 MG Tab [Pharmacy Med Name: BUSPIRONE HCL 5 MG TABLET] 180 tablet 1     Sig: TAKE 1 TABLET (5 MG TOTAL) BY MOUTH 2 (TWO) TIMES DAILY AS NEEDED (ANXIETY).      RX Route: Normal

## 2023-03-28 ENCOUNTER — LAB VISIT (OUTPATIENT)
Dept: LAB | Facility: OTHER | Age: 53
End: 2023-03-28
Attending: PHYSICIAN ASSISTANT
Payer: COMMERCIAL

## 2023-03-28 DIAGNOSIS — L30.9 DERMATITIS: ICD-10-CM

## 2023-03-28 PROCEDURE — 36415 COLL VENOUS BLD VENIPUNCTURE: CPT | Performed by: PHYSICIAN ASSISTANT

## 2023-03-28 PROCEDURE — 86038 ANTINUCLEAR ANTIBODIES: CPT | Performed by: PHYSICIAN ASSISTANT

## 2023-03-29 LAB — ANA SER QL IF: NORMAL

## 2023-04-26 DIAGNOSIS — F41.1 GENERALIZED ANXIETY DISORDER: ICD-10-CM

## 2023-04-26 RX ORDER — FLUOXETINE 10 MG/1
10 CAPSULE ORAL DAILY
Qty: 90 CAPSULE | Refills: 1 | Status: SHIPPED | OUTPATIENT
Start: 2023-04-26 | End: 2023-05-11

## 2023-04-26 NOTE — TELEPHONE ENCOUNTER
No new care gaps identified.  [facility] Embedded Care Gaps. Reference number: [id number]. 4/26/2023   1:51:04 PM CDT

## 2023-04-26 NOTE — TELEPHONE ENCOUNTER
BREASTFEEDING SUPPORT SERVICES NOTE    Time spent with mother/baby: 29 minutes for Breastfeeding Difficulty, SGA, and Maternal Teaching.     Infant output low (1 void, 2 stools), weight loss 6.22% (gain from yesterday).     Family planning to go home today so reviewed the following:  How to know breastfeeding is going well at home.  Feed infant on demand and keep baby close and skin to skin  Discussed cluster feedings.  Encouraged offering both breasts every 2-3 hours with cues, at least 8-12 feedings per day  Discussed pumping timeline  Breasts will become reza in the next 1-3 days, discussed engorgement relief and prevention  Reviewed diet, rest, hydration and prenatal vitamins.   Keep an output/feeding diary until first pediatrician follow up  Phone number for lactation services if assistance needed.    Lactation Note:    LC followed up with mom. Mom reports she continues to nurse baby on her left breast, pump the right breast with the hand pump, and offer baby ebm in a bottle. Mom able to express 15-20 mL this morning; dad giving baby bottle now. Mom reports she has tried to nurse baby on the right with the nipple shield- tender, difficulty latching baby. Mom reports baby latches to the left breast well- does not need a nipple shield. LC assessed mom's right breast: firm, tender, golf ball sized area directly behind nipple. Nipple flat and area firm and tight, but no visible redness or extra warmth. LC assessed mom's left breast: breast soft, breasts filling, no visible damage to nipple- easily everts with compression. LC measured mom's nipples for flange size at home: 13 mm, recommended size 15-16 mm flanges for home.  provided mom with size 21 flanges to use with hand pump currently instead of size 24 flange. LC encouraged mom to continue to offer baby breast on demand on left side, every 2-3 hours. LC advised mom to then pump on her right side for 15-30 minutes. LC advised mom that she can apply a warm,  Refill Routing Note   Refill Routing Note   Medication(s) are not appropriate for processing by [facility] for the following reason(s):      New or recently adjusted medication    [facility] action(s):  Defer          Medication reconciliation completed: No     Appointments  past 12m or future 3m with PCP    Date Provider   Last Visit   2/20/2023 Marva Lama, DO   Next Visit   6/8/2023 Marva Lama, DO   ED visits in past 90 days: 0        Note composed:2:03 PM 04/26/2023              moisture compress to breast prior to pumping and then ice for 20 minutes after pumping. LC advised mom to contact OB for a referral to a breast specialist for an ultrasound if swelling/ tenderness in areola does not improve the next few days with proper flange size and ice. Mom verbalized understanding. Discharge teaching reviewed with mom; see above. LC reviewed and provided mom with handouts on pumping, pump cleaning and ebm storage. Mom aware she can have LC paged up to time of discharge for LC assistance. Mom verbalized understanding and had no further needs, questions or concerns at completion of visit at the bed side.    Lactation to follow.

## 2023-05-11 ENCOUNTER — OFFICE VISIT (OUTPATIENT)
Dept: RHEUMATOLOGY | Facility: CLINIC | Age: 53
End: 2023-05-11
Payer: COMMERCIAL

## 2023-05-11 ENCOUNTER — LAB VISIT (OUTPATIENT)
Dept: LAB | Facility: HOSPITAL | Age: 53
End: 2023-05-11
Attending: INTERNAL MEDICINE
Payer: COMMERCIAL

## 2023-05-11 VITALS
SYSTOLIC BLOOD PRESSURE: 129 MMHG | HEIGHT: 65 IN | WEIGHT: 133.69 LBS | HEART RATE: 83 BPM | DIASTOLIC BLOOD PRESSURE: 81 MMHG | BODY MASS INDEX: 22.27 KG/M2 | OXYGEN SATURATION: 98 %

## 2023-05-11 DIAGNOSIS — M33.90 HELIOTROPE EYELID RASH: ICD-10-CM

## 2023-05-11 LAB
ALBUMIN SERPL BCP-MCNC: 4.5 G/DL (ref 3.5–5.2)
ALP SERPL-CCNC: 46 U/L (ref 55–135)
ALT SERPL W/O P-5'-P-CCNC: 12 U/L (ref 10–44)
ANION GAP SERPL CALC-SCNC: 12 MMOL/L (ref 8–16)
AST SERPL-CCNC: 18 U/L (ref 10–40)
BASOPHILS # BLD AUTO: 0.07 K/UL (ref 0–0.2)
BASOPHILS NFR BLD: 0.8 % (ref 0–1.9)
BILIRUB SERPL-MCNC: 0.5 MG/DL (ref 0.1–1)
BUN SERPL-MCNC: 9 MG/DL (ref 6–20)
CALCIUM SERPL-MCNC: 10 MG/DL (ref 8.7–10.5)
CHLORIDE SERPL-SCNC: 102 MMOL/L (ref 95–110)
CK SERPL-CCNC: 44 U/L (ref 20–180)
CO2 SERPL-SCNC: 23 MMOL/L (ref 23–29)
CREAT SERPL-MCNC: 0.6 MG/DL (ref 0.5–1.4)
CRP SERPL-MCNC: 1.4 MG/L (ref 0–8.2)
DIFFERENTIAL METHOD: ABNORMAL
EOSINOPHIL # BLD AUTO: 0.3 K/UL (ref 0–0.5)
EOSINOPHIL NFR BLD: 3.6 % (ref 0–8)
ERYTHROCYTE [DISTWIDTH] IN BLOOD BY AUTOMATED COUNT: 13.3 % (ref 11.5–14.5)
EST. GFR  (NO RACE VARIABLE): >60 ML/MIN/1.73 M^2
GLUCOSE SERPL-MCNC: 91 MG/DL (ref 70–110)
HCT VFR BLD AUTO: 45.4 % (ref 37–48.5)
HGB BLD-MCNC: 14.3 G/DL (ref 12–16)
IMM GRANULOCYTES # BLD AUTO: 0.02 K/UL (ref 0–0.04)
IMM GRANULOCYTES NFR BLD AUTO: 0.2 % (ref 0–0.5)
LDH SERPL L TO P-CCNC: 168 U/L (ref 110–260)
LYMPHOCYTES # BLD AUTO: 2.2 K/UL (ref 1–4.8)
LYMPHOCYTES NFR BLD: 27 % (ref 18–48)
MCH RBC QN AUTO: 30.2 PG (ref 27–31)
MCHC RBC AUTO-ENTMCNC: 31.5 G/DL (ref 32–36)
MCV RBC AUTO: 96 FL (ref 82–98)
MONOCYTES # BLD AUTO: 0.6 K/UL (ref 0.3–1)
MONOCYTES NFR BLD: 7 % (ref 4–15)
NEUTROPHILS # BLD AUTO: 5.1 K/UL (ref 1.8–7.7)
NEUTROPHILS NFR BLD: 61.4 % (ref 38–73)
NRBC BLD-RTO: 0 /100 WBC
PLATELET # BLD AUTO: 233 K/UL (ref 150–450)
PMV BLD AUTO: 11 FL (ref 9.2–12.9)
POTASSIUM SERPL-SCNC: 4.2 MMOL/L (ref 3.5–5.1)
PROT SERPL-MCNC: 7.7 G/DL (ref 6–8.4)
RBC # BLD AUTO: 4.74 M/UL (ref 4–5.4)
SODIUM SERPL-SCNC: 137 MMOL/L (ref 136–145)
WBC # BLD AUTO: 8.29 K/UL (ref 3.9–12.7)

## 2023-05-11 PROCEDURE — 85025 COMPLETE CBC W/AUTO DIFF WBC: CPT | Performed by: INTERNAL MEDICINE

## 2023-05-11 PROCEDURE — 3079F PR MOST RECENT DIASTOLIC BLOOD PRESSURE 80-89 MM HG: ICD-10-PCS | Mod: CPTII,S$GLB,, | Performed by: INTERNAL MEDICINE

## 2023-05-11 PROCEDURE — 99999 PR PBB SHADOW E&M-EST. PATIENT-LVL IV: CPT | Mod: PBBFAC,,, | Performed by: INTERNAL MEDICINE

## 2023-05-11 PROCEDURE — 86235 NUCLEAR ANTIGEN ANTIBODY: CPT | Mod: 59 | Performed by: INTERNAL MEDICINE

## 2023-05-11 PROCEDURE — 80053 COMPREHEN METABOLIC PANEL: CPT | Performed by: INTERNAL MEDICINE

## 2023-05-11 PROCEDURE — 82550 ASSAY OF CK (CPK): CPT | Performed by: INTERNAL MEDICINE

## 2023-05-11 PROCEDURE — 99999 PR PBB SHADOW E&M-EST. PATIENT-LVL IV: ICD-10-PCS | Mod: PBBFAC,,, | Performed by: INTERNAL MEDICINE

## 2023-05-11 PROCEDURE — 3008F BODY MASS INDEX DOCD: CPT | Mod: CPTII,S$GLB,, | Performed by: INTERNAL MEDICINE

## 2023-05-11 PROCEDURE — 83516 IMMUNOASSAY NONANTIBODY: CPT | Mod: 59 | Performed by: INTERNAL MEDICINE

## 2023-05-11 PROCEDURE — 83520 IMMUNOASSAY QUANT NOS NONAB: CPT | Performed by: INTERNAL MEDICINE

## 2023-05-11 PROCEDURE — 3008F PR BODY MASS INDEX (BMI) DOCUMENTED: ICD-10-PCS | Mod: CPTII,S$GLB,, | Performed by: INTERNAL MEDICINE

## 2023-05-11 PROCEDURE — 1159F PR MEDICATION LIST DOCUMENTED IN MEDICAL RECORD: ICD-10-PCS | Mod: CPTII,S$GLB,, | Performed by: INTERNAL MEDICINE

## 2023-05-11 PROCEDURE — 3074F PR MOST RECENT SYSTOLIC BLOOD PRESSURE < 130 MM HG: ICD-10-PCS | Mod: CPTII,S$GLB,, | Performed by: INTERNAL MEDICINE

## 2023-05-11 PROCEDURE — 36415 COLL VENOUS BLD VENIPUNCTURE: CPT | Mod: PO | Performed by: INTERNAL MEDICINE

## 2023-05-11 PROCEDURE — 99204 PR OFFICE/OUTPT VISIT, NEW, LEVL IV, 45-59 MIN: ICD-10-PCS | Mod: S$GLB,,, | Performed by: INTERNAL MEDICINE

## 2023-05-11 PROCEDURE — 85652 RBC SED RATE AUTOMATED: CPT | Performed by: INTERNAL MEDICINE

## 2023-05-11 PROCEDURE — 1159F MED LIST DOCD IN RCRD: CPT | Mod: CPTII,S$GLB,, | Performed by: INTERNAL MEDICINE

## 2023-05-11 PROCEDURE — 3074F SYST BP LT 130 MM HG: CPT | Mod: CPTII,S$GLB,, | Performed by: INTERNAL MEDICINE

## 2023-05-11 PROCEDURE — 83615 LACTATE (LD) (LDH) ENZYME: CPT | Performed by: INTERNAL MEDICINE

## 2023-05-11 PROCEDURE — 82164 ANGIOTENSIN I ENZYME TEST: CPT | Performed by: INTERNAL MEDICINE

## 2023-05-11 PROCEDURE — 99204 OFFICE O/P NEW MOD 45 MIN: CPT | Mod: S$GLB,,, | Performed by: INTERNAL MEDICINE

## 2023-05-11 PROCEDURE — 3079F DIAST BP 80-89 MM HG: CPT | Mod: CPTII,S$GLB,, | Performed by: INTERNAL MEDICINE

## 2023-05-11 PROCEDURE — 82085 ASSAY OF ALDOLASE: CPT | Performed by: INTERNAL MEDICINE

## 2023-05-11 PROCEDURE — 86140 C-REACTIVE PROTEIN: CPT | Performed by: INTERNAL MEDICINE

## 2023-05-11 NOTE — PROGRESS NOTES
Answers submitted by the patient for this visit:  Rheumatology Questionnaire (Submitted on 5/11/2023)  fever: No  eye redness: Yes  mouth sores: No  headaches: No  shortness of breath: No  chest pain: No  trouble swallowing: No  diarrhea: No  constipation: No  unexpected weight change: No  genital sore: No  dysuria: No  During the last 3 days, have you had a skin rash?: No  Bruises or bleeds easily: No  cough: No

## 2023-05-11 NOTE — PROGRESS NOTES
"Subjective:      Patient ID: Genie Paul is a 52 y.o. female.    Chief Complaint: Rash (Eyelid rash)    52 year old female presents to discuss heliotrope rash. Patient reports rash over bilateral eye lids off and on for the past couple of years associated with dry eye. Denies dry mouth. Denies other rash over her posterior hands or lateral hips. Denies muscle weakness or SOB. She notes the rash is more common when she uses at home cosmetics. Reports less likely to get rash when using only cetaphil. Not on statins. She has seen derm, who ordered LINDA which was negative. RF/CCP have also been negative. She does report rash on her anterior chest as well.   Review of Systems   Constitutional:  Negative for fever and unexpected weight change.   HENT:  Negative for mouth sores and trouble swallowing.    Eyes:  Positive for redness.   Respiratory:  Negative for cough and shortness of breath.    Cardiovascular:  Negative for chest pain.   Gastrointestinal:  Negative for constipation and diarrhea.   Genitourinary:  Negative for dysuria and genital sores.   Skin:  Negative for rash.   Neurological:  Negative for headaches.   Hematological:  Does not bruise/bleed easily.      Objective:   /81 (BP Location: Right arm, Patient Position: Sitting, BP Method: Medium (Automatic))   Pulse 83   Ht 5' 5" (1.651 m)   Wt 60.6 kg (133 lb 11.2 oz)   SpO2 98%   BMI 22.25 kg/m²   Physical Exam   Constitutional: She is oriented to person, place, and time. normal appearance.  Non-toxic appearance. No distress. She does not appear ill.   HENT:   Head: Normocephalic and atraumatic.   Nose: Nose normal. No rhinorrhea or nasal congestion.   Mouth/Throat: Mucous membranes are moist. No oropharyngeal exudate or posterior oropharyngeal erythema.   Eyes: Pupils are equal, round, and reactive to light. Conjunctivae are normal. Right eye exhibits no discharge. Left eye exhibits no discharge. No scleral icterus.   Cardiovascular: Normal " rate, regular rhythm, normal heart sounds and normal pulses. Exam reveals no friction rub.   No murmur heard.  Pulmonary/Chest: Effort normal and breath sounds normal. No stridor. No respiratory distress. She has no wheezes. She has no rhonchi.   Abdominal: Bowel sounds are normal. She exhibits no distension and no mass. There is no abdominal tenderness. There is no rebound and no guarding. No hernia.   Musculoskeletal:         General: No swelling or tenderness. Normal range of motion.      Right lower leg: No edema.      Left lower leg: No edema.   Neurological: She is alert and oriented to person, place, and time. She displays no weakness.   5/5 strength throughout.    Skin: Skin is warm and dry. Rash (no rash noted over eye lids. rash noted over anterior chest.) noted. She is not diaphoretic.   Vitals reviewed.    No data to display     Assessment:     1. Heliotrope eyelid rash      Heliotrope rash: associated with dry eye, but not dry mouth. With rash also noted on anterior chest. No muscle weakness. No gottron's papules or holster sign. No SOB. Consider amyotrophic dermatomyositis vs photosensitivity vs eczema. LINDA negative. RF/CCP negative. Have advised sunscreen use. Will workup to rule out myositis, Sjogrens, etc.   -Ck/Aldolase/LDH/myomarker panel  -CBC/Cmp/ESR/CRP  -Check ACE/IL2/LDH  -Consider HCQ vs MMF vs MTX if any of the above are positive.     6 mo follow up    Plan:     Problem List Items Addressed This Visit          Immunology/Multi System    Heliotrope eyelid rash    Relevant Orders    C-Reactive Protein    CBC Auto Differential    Comprehensive Metabolic Panel    Sedimentation rate    Lactate Dehydrogenase    Anti-Histone Antibody    CK    Aldolase    MyoMarker Panel 3    Angiotensin Converting Enzyme    Interleukin-2 Receptor     Jay Soares

## 2023-05-12 LAB — ERYTHROCYTE [SEDIMENTATION RATE] IN BLOOD BY PHOTOMETRIC METHOD: 5 MM/HR (ref 0–36)

## 2023-05-13 LAB — ACE SERPL-CCNC: 25 U/L (ref 16–85)

## 2023-05-15 LAB
HISTONE IGG SER IA-ACNC: 0.2 UNITS (ref 0–0.9)
SOL IL2 RECEP SERPL-MCNC: 703 PG/ML (ref 175.3–858.2)

## 2023-05-16 LAB — ALDOLASE SERPL-CCNC: 3.2 U/L (ref 1.2–7.6)

## 2023-05-31 LAB
ANTI-PM/SCL AB: <20 UNITS
ANTI-SS-A 52 KD AB, IGG: <20 UNITS
EJ AB SER QL: NEGATIVE
ENA JO1 AB SER IA-ACNC: <20 UNITS
ENA SM+RNP AB SER IA-ACNC: <20 UNITS
FIBRILLARIN (U3 RNP): NEGATIVE
KU AB SER QL: NEGATIVE
MDA-5 (P140): <20 UNITS
MI2 AB SER QL: NEGATIVE
NXP-2 (P140): <20 UNITS
OJ AB SER QL: NEGATIVE
PL12 AB SER QL: NEGATIVE
PL7 AB SER QL: NEGATIVE
SRP AB SERPL QL: NEGATIVE
TIF1 GAMMA (P155/140): <20 UNITS
U2 SNRNP: NEGATIVE

## 2023-06-12 ENCOUNTER — OFFICE VISIT (OUTPATIENT)
Dept: INTERNAL MEDICINE | Facility: CLINIC | Age: 53
End: 2023-06-12
Payer: COMMERCIAL

## 2023-06-12 DIAGNOSIS — J01.40 ACUTE PANSINUSITIS, RECURRENCE NOT SPECIFIED: Primary | ICD-10-CM

## 2023-06-12 DIAGNOSIS — R42 VERTIGO: ICD-10-CM

## 2023-06-12 PROCEDURE — 1159F PR MEDICATION LIST DOCUMENTED IN MEDICAL RECORD: ICD-10-PCS | Mod: CPTII,95,, | Performed by: NURSE PRACTITIONER

## 2023-06-12 PROCEDURE — 99213 OFFICE O/P EST LOW 20 MIN: CPT | Mod: 95,,, | Performed by: NURSE PRACTITIONER

## 2023-06-12 PROCEDURE — 1159F MED LIST DOCD IN RCRD: CPT | Mod: CPTII,95,, | Performed by: NURSE PRACTITIONER

## 2023-06-12 PROCEDURE — 99213 PR OFFICE/OUTPT VISIT, EST, LEVL III, 20-29 MIN: ICD-10-PCS | Mod: 95,,, | Performed by: NURSE PRACTITIONER

## 2023-06-12 PROCEDURE — 1160F RVW MEDS BY RX/DR IN RCRD: CPT | Mod: CPTII,95,, | Performed by: NURSE PRACTITIONER

## 2023-06-12 PROCEDURE — 1160F PR REVIEW ALL MEDS BY PRESCRIBER/CLIN PHARMACIST DOCUMENTED: ICD-10-PCS | Mod: CPTII,95,, | Performed by: NURSE PRACTITIONER

## 2023-06-12 RX ORDER — MECLIZINE HYDROCHLORIDE 25 MG/1
25 TABLET ORAL 3 TIMES DAILY PRN
Qty: 15 TABLET | Refills: 0 | Status: SHIPPED | OUTPATIENT
Start: 2023-06-12 | End: 2023-10-17

## 2023-06-12 RX ORDER — AMOXICILLIN AND CLAVULANATE POTASSIUM 875; 125 MG/1; MG/1
1 TABLET, FILM COATED ORAL EVERY 12 HOURS
Qty: 14 TABLET | Refills: 0 | Status: SHIPPED | OUTPATIENT
Start: 2023-06-12 | End: 2023-06-19

## 2023-06-12 RX ORDER — PREDNISONE 20 MG/1
40 TABLET ORAL DAILY
Qty: 10 TABLET | Refills: 0 | Status: SHIPPED | OUTPATIENT
Start: 2023-06-12 | End: 2023-06-17

## 2023-06-12 NOTE — PROGRESS NOTES
"Subjective:       Patient ID: Genie Paul is a 52 y.o. female.    Chief Complaint: Dizziness    Visit type: audiovisual    Patient is a 52 y.o. female who traditionally follows with Marva Lama DO located in [place] presenting today for:    Dizziness  Patient notes for the past week she has been having dizziness. It is enough to make her fearful to drive as she is feeling unsteady. Notes she hears "crackling" in her head and is having sinus pain and headaches.     Answers submitted by the patient for this visit:  Review of Systems Questionnaire (Submitted on 6/11/2023)  activity change: No  unexpected weight change: No  rhinorrhea: No  trouble swallowing: No  visual disturbance: No  chest tightness: No  polyuria: No  difficulty urinating: No  menstrual problem: No  joint swelling: No  arthralgias: No  confusion: No  dysphoric mood: No    Review of patient's allergies indicates:  No Known Allergies    Medication List with Changes/Refills   New Medications    AMOXICILLIN-CLAVULANATE 875-125MG (AUGMENTIN) 875-125 MG PER TABLET    Take 1 tablet by mouth every 12 (twelve) hours. for 7 days    MECLIZINE (ANTIVERT) 25 MG TABLET    Take 1 tablet (25 mg total) by mouth 3 (three) times daily as needed for Dizziness.    PREDNISONE (DELTASONE) 20 MG TABLET    Take 2 tablets (40 mg total) by mouth once daily. for 5 days   Current Medications    BUSPIRONE (BUSPAR) 5 MG TAB    Take 1 tablet (5 mg total) by mouth 2 (two) times daily as needed (anxiety).    DESONIDE 0.05% (DESOWEN) 0.05 % OINT    Apply topically 2 (two) times daily.    MUPIROCIN (BACTROBAN) 2 % OINTMENT    Apply topically 3 (three) times daily. AAA bid prn rash of eyelid.    VARICELLA-ZOSTER GE-AS01B, PF, (SHINGRIX, PF,) 50 MCG/0.5 ML INJECTION    Inject 0.5mL IM at 0 and 2-6 months     Medical, social and surgical history has been reviewed with the patient.      Review of Systems   HENT:  Positive for sinus pain. Negative for hearing loss.    Eyes:  " Negative for discharge.   Respiratory:  Negative for wheezing.    Cardiovascular:  Negative for chest pain and palpitations.   Gastrointestinal:  Negative for blood in stool, constipation, diarrhea and vomiting.   Genitourinary:  Negative for dysuria and hematuria.   Musculoskeletal:  Negative for neck pain.   Neurological:  Positive for dizziness and headaches. Negative for weakness.   Endo/Heme/Allergies:  Negative for polydipsia.     Objective:   LMP 05/17/2023     Physical Exam  Vitals reviewed: Exam Limited due to Video Consultation.   Constitutional:       General: She is not in acute distress.  HENT:      Head: Normocephalic.      Nose:      Comments: Provider directed patient to palpate maxillary and frontal sinuses with reports of tenderness.  Neurological:      Mental Status: She is alert and oriented to person, place, and time.       Last Labs:  Glucose   Date Value Ref Range Status   05/11/2023 91 70 - 110 mg/dL Final   01/06/2023 95 70 - 110 mg/dL Final     BUN   Date Value Ref Range Status   05/11/2023 9 6 - 20 mg/dL Final   01/06/2023 8 6 - 20 mg/dL Final     Creatinine   Date Value Ref Range Status   05/11/2023 0.6 0.5 - 1.4 mg/dL Final   01/06/2023 0.7 0.5 - 1.4 mg/dL Final     Cholesterol   Date Value Ref Range Status   07/09/2021 214 (H) 120 - 199 mg/dL Final     Comment:     The National Cholesterol Education Program (NCEP) has set the  following guidelines (reference ranges) for Cholesterol:  Optimal.....................<200 mg/dL  Borderline High.............200-239 mg/dL  High........................> or = 240 mg/dL       Hemoglobin A1C   Date Value Ref Range Status   07/09/2021 4.6 4.0 - 5.6 % Final     Comment:     ADA Screening Guidelines:  5.7-6.4%  Consistent with prediabetes  >or=6.5%  Consistent with diabetes    High levels of fetal hemoglobin interfere with the HbA1C  assay. Heterozygous hemoglobin variants (HbS, HgC, etc)do  not significantly interfere with this assay.   However,  presence of multiple variants may affect accuracy.       Hemoglobin   Date Value Ref Range Status   05/11/2023 14.3 12.0 - 16.0 g/dL Final   01/06/2023 14.4 12.0 - 16.0 g/dL Final     Hematocrit   Date Value Ref Range Status   05/11/2023 45.4 37.0 - 48.5 % Final   01/06/2023 45.5 37.0 - 48.5 % Final       I have reviewed the following:     Details / Date    [x]   Labs     []   Micro     []   Pathology     []   Imaging     []   Cardiology Procedures     []   Other        Assessment and Plan:     1. Acute pansinusitis, recurrence not specified  - predniSONE (DELTASONE) 20 MG tablet; Take 2 tablets (40 mg total) by mouth once daily. for 5 days  Dispense: 10 tablet; Refill: 0  - amoxicillin-clavulanate 875-125mg (AUGMENTIN) 875-125 mg per tablet; Take 1 tablet by mouth every 12 (twelve) hours. for 7 days  Dispense: 14 tablet; Refill: 0    2. Vertigo  - meclizine (ANTIVERT) 25 mg tablet; Take 1 tablet (25 mg total) by mouth 3 (three) times daily as needed for Dizziness.  Dispense: 15 tablet; Refill: 0    Patient advised dizziness is likely secondary to sinusitis. Will treat as such. Advised to contact clinic should symptoms not improve and to present to ER for evaluation should symptoms worsen.     Face to Face time with patient: 4  15 minutes of total time spent on the encounter, which includes face to face time and non-face to face time preparing to see the patient (eg, review of tests), Obtaining and/or reviewing separately obtained history, Documenting clinical information in the electronic or other health record, Independently interpreting results (not separately reported) and communicating results to the patient/family/caregiver, or Care coordination (not separately reported).       Each patient to whom he or she provides medical services by telemedicine is:  (1) informed of the relationship between the physician and patient and the respective role of any other health care provider with respect to management of the  patient; and (2) notified that he or she may decline to receive medical services by telemedicine and may withdraw from such care at any time.

## 2023-06-21 ENCOUNTER — PATIENT MESSAGE (OUTPATIENT)
Dept: ADMINISTRATIVE | Facility: HOSPITAL | Age: 53
End: 2023-06-21
Payer: COMMERCIAL

## 2023-06-21 DIAGNOSIS — Z12.11 SCREENING FOR COLON CANCER: ICD-10-CM

## 2023-07-13 ENCOUNTER — OFFICE VISIT (OUTPATIENT)
Dept: INTERNAL MEDICINE | Facility: CLINIC | Age: 53
End: 2023-07-13
Payer: COMMERCIAL

## 2023-07-13 VITALS
SYSTOLIC BLOOD PRESSURE: 124 MMHG | BODY MASS INDEX: 22.08 KG/M2 | DIASTOLIC BLOOD PRESSURE: 78 MMHG | WEIGHT: 132.5 LBS | HEIGHT: 65 IN | HEART RATE: 98 BPM | OXYGEN SATURATION: 98 %

## 2023-07-13 DIAGNOSIS — K21.9 GASTROESOPHAGEAL REFLUX DISEASE, UNSPECIFIED WHETHER ESOPHAGITIS PRESENT: Primary | ICD-10-CM

## 2023-07-13 DIAGNOSIS — R42 VERTIGO: ICD-10-CM

## 2023-07-13 PROCEDURE — 99999 PR PBB SHADOW E&M-EST. PATIENT-LVL IV: ICD-10-PCS | Mod: PBBFAC,,, | Performed by: PHYSICIAN ASSISTANT

## 2023-07-13 PROCEDURE — 3074F PR MOST RECENT SYSTOLIC BLOOD PRESSURE < 130 MM HG: ICD-10-PCS | Mod: CPTII,S$GLB,, | Performed by: PHYSICIAN ASSISTANT

## 2023-07-13 PROCEDURE — 3008F PR BODY MASS INDEX (BMI) DOCUMENTED: ICD-10-PCS | Mod: CPTII,S$GLB,, | Performed by: PHYSICIAN ASSISTANT

## 2023-07-13 PROCEDURE — 99213 PR OFFICE/OUTPT VISIT, EST, LEVL III, 20-29 MIN: ICD-10-PCS | Mod: S$GLB,,, | Performed by: PHYSICIAN ASSISTANT

## 2023-07-13 PROCEDURE — 3074F SYST BP LT 130 MM HG: CPT | Mod: CPTII,S$GLB,, | Performed by: PHYSICIAN ASSISTANT

## 2023-07-13 PROCEDURE — 99999 PR PBB SHADOW E&M-EST. PATIENT-LVL IV: CPT | Mod: PBBFAC,,, | Performed by: PHYSICIAN ASSISTANT

## 2023-07-13 PROCEDURE — 3078F DIAST BP <80 MM HG: CPT | Mod: CPTII,S$GLB,, | Performed by: PHYSICIAN ASSISTANT

## 2023-07-13 PROCEDURE — 99213 OFFICE O/P EST LOW 20 MIN: CPT | Mod: S$GLB,,, | Performed by: PHYSICIAN ASSISTANT

## 2023-07-13 PROCEDURE — 1159F PR MEDICATION LIST DOCUMENTED IN MEDICAL RECORD: ICD-10-PCS | Mod: CPTII,S$GLB,, | Performed by: PHYSICIAN ASSISTANT

## 2023-07-13 PROCEDURE — 3008F BODY MASS INDEX DOCD: CPT | Mod: CPTII,S$GLB,, | Performed by: PHYSICIAN ASSISTANT

## 2023-07-13 PROCEDURE — 1159F MED LIST DOCD IN RCRD: CPT | Mod: CPTII,S$GLB,, | Performed by: PHYSICIAN ASSISTANT

## 2023-07-13 PROCEDURE — 3078F PR MOST RECENT DIASTOLIC BLOOD PRESSURE < 80 MM HG: ICD-10-PCS | Mod: CPTII,S$GLB,, | Performed by: PHYSICIAN ASSISTANT

## 2023-07-13 RX ORDER — FLUTICASONE PROPIONATE 50 MCG
1 SPRAY, SUSPENSION (ML) NASAL DAILY
Qty: 11.1 ML | Refills: 0 | Status: SHIPPED | OUTPATIENT
Start: 2023-07-13 | End: 2023-10-17

## 2023-07-13 RX ORDER — CETIRIZINE HYDROCHLORIDE 10 MG/1
10 TABLET ORAL DAILY
Qty: 30 TABLET | Refills: 0 | Status: SHIPPED | OUTPATIENT
Start: 2023-07-13 | End: 2023-10-17

## 2023-07-13 NOTE — PROGRESS NOTES
INTERNAL MEDICINE URGENT VISIT NOTE    CHIEF COMPLAINT     Chief Complaint   Patient presents with    Dizziness       HPI     Genie Paul is a 52 y.o. female who presents for an urgent visit today.    PCP is Marva Lama DO, patient is new to me.     Patient presents with complaints of dizziness. She was seen on 6/12/2023 for similar symptoms. She was diagnosed with acute pansinusitis and treated with Augmentin, prednisone, and meclizine. She reports that she was compliant with abx and steroids -she did not take meclizine.     Today in office she is still having symptoms, no worsening - only persistent.     Also having worsening GERD symptoms -no chest pain, no vomiting.       Past Medical History:  History reviewed. No pertinent past medical history.    Home Medications:  Prior to Admission medications    Medication Sig Start Date End Date Taking? Authorizing Provider   busPIRone (BUSPAR) 5 MG Tab Take 1 tablet (5 mg total) by mouth 2 (two) times daily as needed (anxiety). 3/21/23 6/19/23  Marva Lama DO   desonide 0.05% (DESOWEN) 0.05 % Oint Apply topically 2 (two) times daily. 3/20/23   Amber Dewey PA-C   meclizine (ANTIVERT) 25 mg tablet Take 1 tablet (25 mg total) by mouth 3 (three) times daily as needed for Dizziness. 6/12/23   Meredith Mays, KWAME   mupirocin (BACTROBAN) 2 % ointment Apply topically 3 (three) times daily. AAA bid prn rash of eyelid. 3/20/23   Amber Dewey PA-C   varicella-zoster gE-AS01B, PF, (SHINGRIX, PF,) 50 mcg/0.5 mL injection Inject 0.5mL IM at 0 and 2-6 months  Patient not taking: Reported on 5/11/2023 2/20/23   Marva Lama DO       Review of Systems:  Review of Systems   Constitutional:  Negative for chills and fever.   HENT:  Negative for sore throat and trouble swallowing.    Eyes:  Negative for visual disturbance.   Respiratory:  Negative for cough and shortness of breath.    Cardiovascular:  Negative for chest pain.   Gastrointestinal:  Negative for  "abdominal pain, constipation, diarrhea, nausea and vomiting.   Genitourinary:  Negative for dysuria and flank pain.   Musculoskeletal:  Negative for back pain, neck pain and neck stiffness.   Skin:  Negative for rash.   Neurological:  Negative for dizziness, syncope, weakness and headaches.   Psychiatric/Behavioral:  Negative for confusion.      Health Maintainence:   Immunizations:  Health Maintenance         Date Due Completion Date    TETANUS VACCINE Never done ---    Shingles Vaccine (1 of 2) Never done ---    Colorectal Cancer Screening 07/21/2023 7/21/2022    Influenza Vaccine (1) 09/01/2023 11/1/2022    Mammogram 11/22/2023 11/22/2022    Lipid Panel 08/18/2027 8/18/2022    Cervical Cancer Screening 11/01/2027 11/1/2022             PHYSICAL EXAM     /78 (BP Location: Left arm, Patient Position: Sitting, BP Method: Large (Manual))   Pulse 98   Ht 5' 5" (1.651 m)   Wt 60.1 kg (132 lb 7.9 oz)   LMP 07/08/2023 (Exact Date)   SpO2 98%   BMI 22.05 kg/m²     Physical Exam  Vitals and nursing note reviewed.   Constitutional:       Appearance: Normal appearance.      Comments: Healthy appearing female in NAD or apparent pain. She makes good eye contact, speaks in clear full sentences and ambulates with ease.        HENT:      Head: Normocephalic and atraumatic.      Nose: Nose normal.      Mouth/Throat:      Pharynx: Oropharynx is clear.   Eyes:      Conjunctiva/sclera: Conjunctivae normal.   Cardiovascular:      Rate and Rhythm: Normal rate and regular rhythm.      Pulses: Normal pulses.   Pulmonary:      Effort: No respiratory distress.   Abdominal:      Tenderness: There is no abdominal tenderness.   Musculoskeletal:         General: Normal range of motion.      Cervical back: No rigidity.   Skin:     General: Skin is warm and dry.      Capillary Refill: Capillary refill takes less than 2 seconds.      Findings: No rash.   Neurological:      General: No focal deficit present.      Mental Status: She is " alert.      Gait: Gait normal.   Psychiatric:         Mood and Affect: Mood normal.       LABS     Lab Results   Component Value Date    HGBA1C 4.6 07/09/2021     CMP  Sodium   Date Value Ref Range Status   05/11/2023 137 136 - 145 mmol/L Final     Potassium   Date Value Ref Range Status   05/11/2023 4.2 3.5 - 5.1 mmol/L Final     Chloride   Date Value Ref Range Status   05/11/2023 102 95 - 110 mmol/L Final     CO2   Date Value Ref Range Status   05/11/2023 23 23 - 29 mmol/L Final     Glucose   Date Value Ref Range Status   05/11/2023 91 70 - 110 mg/dL Final     BUN   Date Value Ref Range Status   05/11/2023 9 6 - 20 mg/dL Final     Creatinine   Date Value Ref Range Status   05/11/2023 0.6 0.5 - 1.4 mg/dL Final     Calcium   Date Value Ref Range Status   05/11/2023 10.0 8.7 - 10.5 mg/dL Final     Total Protein   Date Value Ref Range Status   05/11/2023 7.7 6.0 - 8.4 g/dL Final     Albumin   Date Value Ref Range Status   05/11/2023 4.5 3.5 - 5.2 g/dL Final     Total Bilirubin   Date Value Ref Range Status   05/11/2023 0.5 0.1 - 1.0 mg/dL Final     Comment:     For infants and newborns, interpretation of results should be based  on gestational age, weight and in agreement with clinical  observations.    Premature Infant recommended reference ranges:  Up to 24 hours.............<8.0 mg/dL  Up to 48 hours............<12.0 mg/dL  3-5 days..................<15.0 mg/dL  6-29 days.................<15.0 mg/dL       Alkaline Phosphatase   Date Value Ref Range Status   05/11/2023 46 (L) 55 - 135 U/L Final     AST   Date Value Ref Range Status   05/11/2023 18 10 - 40 U/L Final     ALT   Date Value Ref Range Status   05/11/2023 12 10 - 44 U/L Final     Anion Gap   Date Value Ref Range Status   05/11/2023 12 8 - 16 mmol/L Final     eGFR if    Date Value Ref Range Status   07/09/2021 >60 >60 mL/min/1.73 m^2 Final     eGFR if non    Date Value Ref Range Status   07/09/2021 >60 >60 mL/min/1.73 m^2  Final     Comment:     Calculation used to obtain the estimated glomerular filtration  rate (eGFR) is the CKD-EPI equation.        Lab Results   Component Value Date    WBC 8.29 05/11/2023    HGB 14.3 05/11/2023    HCT 45.4 05/11/2023    MCV 96 05/11/2023     05/11/2023     Lab Results   Component Value Date    CHOL 214 (H) 07/09/2021     Lab Results   Component Value Date    HDL 89 (H) 07/09/2021     Lab Results   Component Value Date    LDLCALC 104.8 07/09/2021     Lab Results   Component Value Date    TRIG 101 07/09/2021     Lab Results   Component Value Date    CHOLHDL 41.6 07/09/2021     Lab Results   Component Value Date    TSH 2.265 07/09/2021    P0OTSIV 7.7 07/09/2021       ASSESSMENT/PLAN     Genie Paul is a 52 y.o. female     Genie was seen today for dizziness.    Diagnoses and all orders for this visit:    Gastroesophageal reflux disease, unspecified whether esophagitis present  -     Ambulatory referral/consult to Gastroenterology; Future    Vertigo  -     Ambulatory referral/consult to ENT; Future    Other orders  -     fluticasone propionate (FLONASE) 50 mcg/actuation nasal spray; 1 spray (50 mcg total) by Each Nostril route once daily.  -     cetirizine (ZYRTEC) 10 MG tablet; Take 1 tablet (10 mg total) by mouth once daily.       Patient was counseled on when and how to seek emergent care.       Marni Nowak PA-C   Department of Internal Medicine - [facility]   2:33 PM

## 2023-10-05 ENCOUNTER — PATIENT MESSAGE (OUTPATIENT)
Dept: OTOLARYNGOLOGY | Facility: CLINIC | Age: 53
End: 2023-10-05
Payer: COMMERCIAL

## 2023-10-17 ENCOUNTER — TELEPHONE (OUTPATIENT)
Dept: ENDOSCOPY | Facility: HOSPITAL | Age: 53
End: 2023-10-17
Payer: COMMERCIAL

## 2023-10-17 ENCOUNTER — LAB VISIT (OUTPATIENT)
Dept: LAB | Facility: HOSPITAL | Age: 53
End: 2023-10-17
Payer: COMMERCIAL

## 2023-10-17 ENCOUNTER — OFFICE VISIT (OUTPATIENT)
Dept: GASTROENTEROLOGY | Facility: CLINIC | Age: 53
End: 2023-10-17
Payer: COMMERCIAL

## 2023-10-17 VITALS
HEART RATE: 76 BPM | BODY MASS INDEX: 23.16 KG/M2 | HEIGHT: 65 IN | DIASTOLIC BLOOD PRESSURE: 77 MMHG | WEIGHT: 139.13 LBS | HEIGHT: 65 IN | WEIGHT: 139 LBS | SYSTOLIC BLOOD PRESSURE: 117 MMHG | BODY MASS INDEX: 23.18 KG/M2

## 2023-10-17 DIAGNOSIS — K21.9 GASTROESOPHAGEAL REFLUX DISEASE, UNSPECIFIED WHETHER ESOPHAGITIS PRESENT: Primary | ICD-10-CM

## 2023-10-17 DIAGNOSIS — K21.9 GASTROESOPHAGEAL REFLUX DISEASE, UNSPECIFIED WHETHER ESOPHAGITIS PRESENT: ICD-10-CM

## 2023-10-17 DIAGNOSIS — Z12.11 ENCOUNTER FOR SCREENING COLONOSCOPY: Primary | ICD-10-CM

## 2023-10-17 DIAGNOSIS — Z12.11 COLON CANCER SCREENING: ICD-10-CM

## 2023-10-17 LAB
BASOPHILS # BLD AUTO: 0.05 K/UL (ref 0–0.2)
BASOPHILS NFR BLD: 0.6 % (ref 0–1.9)
DIFFERENTIAL METHOD: ABNORMAL
EOSINOPHIL # BLD AUTO: 0.3 K/UL (ref 0–0.5)
EOSINOPHIL NFR BLD: 3.2 % (ref 0–8)
ERYTHROCYTE [DISTWIDTH] IN BLOOD BY AUTOMATED COUNT: 12.6 % (ref 11.5–14.5)
HCT VFR BLD AUTO: 43.7 % (ref 37–48.5)
HGB BLD-MCNC: 14.2 G/DL (ref 12–16)
IMM GRANULOCYTES # BLD AUTO: 0.02 K/UL (ref 0–0.04)
IMM GRANULOCYTES NFR BLD AUTO: 0.2 % (ref 0–0.5)
LYMPHOCYTES # BLD AUTO: 1.8 K/UL (ref 1–4.8)
LYMPHOCYTES NFR BLD: 22.2 % (ref 18–48)
MCH RBC QN AUTO: 31.1 PG (ref 27–31)
MCHC RBC AUTO-ENTMCNC: 32.5 G/DL (ref 32–36)
MCV RBC AUTO: 96 FL (ref 82–98)
MONOCYTES # BLD AUTO: 0.5 K/UL (ref 0.3–1)
MONOCYTES NFR BLD: 6.5 % (ref 4–15)
NEUTROPHILS # BLD AUTO: 5.4 K/UL (ref 1.8–7.7)
NEUTROPHILS NFR BLD: 67.3 % (ref 38–73)
NRBC BLD-RTO: 0 /100 WBC
PLATELET # BLD AUTO: 227 K/UL (ref 150–450)
PMV BLD AUTO: 10.6 FL (ref 9.2–12.9)
RBC # BLD AUTO: 4.57 M/UL (ref 4–5.4)
WBC # BLD AUTO: 8.03 K/UL (ref 3.9–12.7)

## 2023-10-17 PROCEDURE — 3008F PR BODY MASS INDEX (BMI) DOCUMENTED: ICD-10-PCS | Mod: CPTII,S$GLB,,

## 2023-10-17 PROCEDURE — 85025 COMPLETE CBC W/AUTO DIFF WBC: CPT

## 2023-10-17 PROCEDURE — 36415 COLL VENOUS BLD VENIPUNCTURE: CPT

## 2023-10-17 PROCEDURE — 3074F PR MOST RECENT SYSTOLIC BLOOD PRESSURE < 130 MM HG: ICD-10-PCS | Mod: CPTII,S$GLB,,

## 2023-10-17 PROCEDURE — 3078F DIAST BP <80 MM HG: CPT | Mod: CPTII,S$GLB,,

## 2023-10-17 PROCEDURE — 3074F SYST BP LT 130 MM HG: CPT | Mod: CPTII,S$GLB,,

## 2023-10-17 PROCEDURE — 3008F BODY MASS INDEX DOCD: CPT | Mod: CPTII,S$GLB,,

## 2023-10-17 PROCEDURE — 99204 PR OFFICE/OUTPT VISIT, NEW, LEVL IV, 45-59 MIN: ICD-10-PCS | Mod: S$GLB,,,

## 2023-10-17 PROCEDURE — 99999 PR PBB SHADOW E&M-EST. PATIENT-LVL IV: ICD-10-PCS | Mod: PBBFAC,,,

## 2023-10-17 PROCEDURE — 99999 PR PBB SHADOW E&M-EST. PATIENT-LVL IV: CPT | Mod: PBBFAC,,,

## 2023-10-17 PROCEDURE — 99204 OFFICE O/P NEW MOD 45 MIN: CPT | Mod: S$GLB,,,

## 2023-10-17 PROCEDURE — 86677 HELICOBACTER PYLORI ANTIBODY: CPT

## 2023-10-17 PROCEDURE — 3078F PR MOST RECENT DIASTOLIC BLOOD PRESSURE < 80 MM HG: ICD-10-PCS | Mod: CPTII,S$GLB,,

## 2023-10-17 RX ORDER — CALC/MAG/B COMPLEX/D3/HERB 61
15 TABLET ORAL DAILY
COMMUNITY
End: 2023-10-17 | Stop reason: DRUGHIGH

## 2023-10-17 RX ORDER — LANSOPRAZOLE 30 MG/1
30 CAPSULE, DELAYED RELEASE ORAL DAILY
Qty: 30 CAPSULE | Refills: 11 | Status: SHIPPED | OUTPATIENT
Start: 2023-10-17 | End: 2024-01-31 | Stop reason: SDUPTHER

## 2023-10-17 RX ORDER — NALTREXONE HYDROCHLORIDE 50 MG/1
50 TABLET, FILM COATED ORAL
COMMUNITY

## 2023-10-17 NOTE — PROGRESS NOTES
"GENERAL GI PATIENT INTAKE:    COVID symptoms in the last 7 days (runny nose, sore throat, congestion, cough, fever): No  PCP: Marva Lama  If not PCP-  number given to establish [phone]: N/A    ALLERGIES REVIEWED:  Yes    CHIEF COMPLAINT:    Chief Complaint   Patient presents with    Gastroesophageal Reflux       VITAL SIGNS:  /77   Pulse 76   Ht 5' 5" (1.651 m)   Wt 63.1 kg (139 lb 1.8 oz)   BMI 23.15 kg/m²      Change in medical, surgical, family or social history: No      REVIEWED MEDICATION LIST RECONCILED INCLUDING ABOVE MEDS:  Yes     "

## 2023-10-17 NOTE — PROGRESS NOTES
Gastroenterology Clinic Consultation Note    Reason for Visit:  The primary encounter diagnosis was Gastroesophageal reflux disease, unspecified whether esophagitis present. A diagnosis of Colon cancer screening was also pertinent to this visit.    PCP:   Marva Lama   [address] / [place] [address]      Initial HPI   This is a 53 y.o. female presenting for worsening GERD symptoms for the last several months. Referred from her PCP for further evaluation. Patient reports atypical chest pain that radiates to her arms, palpitations which prompted an ER visit. ACS was ruled out and GERD was thought to be contributing to her atypical chest pain. Taking Prevacid OTC dose that she feels is helping her symptoms. Denies esophageal burning, pyrosis, epigastric pain, nausea, vomiting, constipation or diarrhea.       ROS:  Review of Systems   Constitutional:  Negative for chills, diaphoresis, fever, malaise/fatigue and weight loss.   HENT:  Negative for sore throat.    Cardiovascular:  Positive for chest pain.   Gastrointestinal:  Positive for heartburn. Negative for nausea and vomiting.   Skin:  Negative for itching and rash.   Neurological:  Negative for dizziness, loss of consciousness and weakness.        Medical History:  has no past medical history on file.    Surgical History:  has a past surgical history that includes Eye surgery (not sure).    Family History: family history includes Alcohol abuse in her brother; Autoimmune disease in her mother; Breast cancer in an other family member; Cancer in her brother, maternal aunt, maternal grandfather, maternal uncle, and mother; Early death in her brother; Hearing loss in her father; Stroke in her maternal grandmother..       Review of patient's allergies indicates:  No Known Allergies    Current Outpatient Medications on File Prior to Visit   Medication Sig Dispense Refill    naltrexone  "(DEPADE) 50 mg tablet Take 50 mg by mouth.      [DISCONTINUED] lansoprazole (PREVACID) 15 MG capsule Take 15 mg by mouth once daily.      busPIRone (BUSPAR) 5 MG Tab Take 1 tablet (5 mg total) by mouth 2 (two) times daily as needed (anxiety). 180 tablet 0    cetirizine (ZYRTEC) 10 MG tablet Take 1 tablet (10 mg total) by mouth once daily. 30 tablet 0    desonide 0.05% (DESOWEN) 0.05 % Oint Apply topically 2 (two) times daily. 15 g 0    fluticasone propionate (FLONASE) 50 mcg/actuation nasal spray 1 spray (50 mcg total) by Each Nostril route once daily. 11.1 mL 0    meclizine (ANTIVERT) 25 mg tablet Take 1 tablet (25 mg total) by mouth 3 (three) times daily as needed for Dizziness. 15 tablet 0    mupirocin (BACTROBAN) 2 % ointment Apply topically 3 (three) times daily. AAA bid prn rash of eyelid. 22 g 0    varicella-zoster gE-AS01B, PF, (SHINGRIX, PF,) 50 mcg/0.5 mL injection Inject 0.5mL IM at 0 and 2-6 months (Patient not taking: Reported on 7/13/2023) 1 each 1     No current facility-administered medications on file prior to visit.         Objective Findings:    Vital Signs:  /77   Pulse 76   Ht 5' 5" (1.651 m)   Wt 63.1 kg (139 lb 1.8 oz)   BMI 23.15 kg/m²   Body mass index is 23.15 kg/m².    Physical Exam:  Physical Exam  Vitals reviewed.   Constitutional:       Appearance: She is normal weight. She is not ill-appearing.   HENT:      Mouth/Throat:      Mouth: Mucous membranes are moist.      Pharynx: Oropharynx is clear.   Eyes:      General: No scleral icterus.  Abdominal:      General: Abdomen is flat. Bowel sounds are normal. There is no distension.      Palpations: Abdomen is soft. There is no mass.      Tenderness: There is no abdominal tenderness.      Hernia: No hernia is present.   Skin:     General: Skin is warm and dry.      Capillary Refill: Capillary refill takes less than 2 seconds.      Coloration: Skin is not jaundiced or pale.      Findings: No bruising or erythema.   Neurological:      " Mental Status: She is alert and oriented to person, place, and time. Mental status is at baseline.             Labs:  Lab Results   Component Value Date    WBC 8.29 05/11/2023    HGB 14.3 05/11/2023    HCT 45.4 05/11/2023     05/11/2023    CRP 1.4 05/11/2023    CHOL 214 (H) 07/09/2021    TRIG 101 07/09/2021    HDL 89 (H) 07/09/2021    ALKPHOS 46 (L) 05/11/2023    LIPASE 17 10/03/2022    ALT 12 05/11/2023    AST 18 05/11/2023     05/11/2023    K 4.2 05/11/2023     05/11/2023    CREATININE 0.6 05/11/2023    BUN 9 05/11/2023    CO2 23 05/11/2023    TSH 2.265 07/09/2021    HGBA1C 4.6 07/09/2021       Imaging reviewed: No pertinent imaging reviewed      Endoscopy reviewed: No prior endoscopy performed.     FiT kit completed 7/21/2022 (negative)      Assessment:  1. Gastroesophageal reflux disease, unspecified whether esophagitis present    2. Colon cancer screening             Plan:  Prevacid OTC seems to be controlling her symptoms. Will increase to 30mg and send prescription. Continue to 8 weeks. Proceed with EGD for further evaluation of patient Atypical chest pain, GERD symptoms. H. Pylori serology today as patient taking PPI currently. Will treat if positive.   Colonoscopy referral placed for screening purposes.   RTC post procedure.      Thank you for allowing me to participate in this patient's care.    Sincerely,     Isamar Hidalgo NP  Gastroenterology Department  [facility]

## 2023-10-17 NOTE — TELEPHONE ENCOUNTER
"----- Message from Isamar Hidalgo NP sent at 10/17/2023 11:36 AM CDT -----  Regarding: EGD/Colonoscopy  Procedure: EGD/Colonoscopy    Diagnosis: Screening colonoscopy and GERD    Procedure Timin-8 weeks    #If within 4 weeks selected, please ranjit as high priority#    #If greater than 12 weeks, please select "5-12 weeks" and delay sending until 3 months prior to requested date#     Provider: Any GI provider    Location: [facility], [facility], and [facility]    Additional Scheduling Information: No scheduling concerns    Prep Specifications:Standard prep    Is the patient taking a GLP-1 Agonist:no    Have you attached a patient to this message: yes      "

## 2023-10-17 NOTE — TELEPHONE ENCOUNTER
"----- Message from Isamar Hidalgo NP sent at 10/17/2023 11:36 AM CDT -----  Regarding: EGD/Colonoscopy  Procedure: EGD/Colonoscopy    Diagnosis: Screening colonoscopy and GERD    Procedure Timin-8 weeks    #If within 4 weeks selected, please ranjit as high priority#    #If greater than 12 weeks, please select "5-12 weeks" and delay sending until 3 months prior to requested date#     Provider: Any GI provider    Location: [facility], [facility]-Endo, and [facility]-End[facility]    Additional Scheduling Information: No scheduling concerns    Prep Specifications:Standard prep    Is the patient taking a GLP-1 Agonist:no    Have you attached a patient to this message: yes      "

## 2023-10-17 NOTE — TELEPHONE ENCOUNTER
----- Message from Emma Georges CMA sent at 6/21/2021  1:24 PM CDT -----  Please assist. Thank you.  ----- Message -----  From: Yesica Bustillo MD  Sent: 6/19/2021   7:40 PM CDT  To: , #    RN please advise patient she is spilling please call patient with results.  A little protein in her urine, her cholesterol was normal, her diabetes is not improved and her A1c was 8.7.  I believe she has an appointment with endocrinology.  She remains mildly anemic.     Spoke to patient to schedule procedure(s) Colonoscopy/EGD       Physician to perform procedure(s) Dr. CHELSEA Kovacs  Date of Procedure (s) 01/23/2024  Arrival Time 12:15 pm   Time of Procedure(s) 1/15:pm    Location of Procedure(s) [facility]  Type of Rx Prep sent to patient: PEG  Instructions provided to patient via [facility]    Patient was informed on the following information and verbalized understanding. Screening questionnaire reviewed with patient and complete. If procedure requires anesthesia, a responsible adult needs to be present to accompany the patient home, patient cannot drive after receiving anesthesia. Appointment details are tentative, especially check-in time. Patient will receive a prep-op call 4 days prior to confirm check-in time for procedure. If applicable the patient should contact their pharmacy to verify Rx for procedure prep is ready for pick-up. Patient was advised to call the scheduling department at [phone] if pharmacy states no Rx is available. Patient was advised to call the endoscopy scheduling department if any questions or concerns arise.      [facility] Scheduling Department

## 2023-10-17 NOTE — PATIENT INSTRUCTIONS
For GERD/Reflux:     Take your PPI 30-45 minutes before your first protein containing meal (breakfast) every day. Take once daily for 8 weeks. If symptoms improve ok discontinue an use PPI as needed for symptoms.      Take Pepcid 20mg every evening before bedtime to help with nocturnal symptoms, as needed.     Remain upright for at least 3 hours after eating.      Elevate the head of the bed for nighttime.      Avoid foods that you have noticed make your symptoms worse (possible triggers include: peppermint, alcohol, chocolate, caffeine, spicy foods, greasy/fried foods, acidic foods-citrus).

## 2023-10-18 ENCOUNTER — PATIENT OUTREACH (OUTPATIENT)
Dept: ADMINISTRATIVE | Facility: HOSPITAL | Age: 53
End: 2023-10-18
Payer: COMMERCIAL

## 2023-10-18 ENCOUNTER — PATIENT MESSAGE (OUTPATIENT)
Dept: ADMINISTRATIVE | Facility: HOSPITAL | Age: 53
End: 2023-10-18
Payer: COMMERCIAL

## 2023-10-18 DIAGNOSIS — Z12.31 ENCOUNTER FOR SCREENING MAMMOGRAM FOR BREAST CANCER: Primary | ICD-10-CM

## 2023-10-18 LAB — H PYLORI IGG SERPL QL IA: NEGATIVE

## 2023-10-31 ENCOUNTER — OFFICE VISIT (OUTPATIENT)
Dept: CARDIOLOGY | Facility: CLINIC | Age: 53
End: 2023-10-31
Payer: COMMERCIAL

## 2023-10-31 VITALS
HEART RATE: 73 BPM | WEIGHT: 138.69 LBS | SYSTOLIC BLOOD PRESSURE: 113 MMHG | BODY MASS INDEX: 23.08 KG/M2 | DIASTOLIC BLOOD PRESSURE: 78 MMHG

## 2023-10-31 DIAGNOSIS — R07.9 CHEST PAIN, UNSPECIFIED TYPE: ICD-10-CM

## 2023-10-31 DIAGNOSIS — Z13.6 ENCOUNTER FOR SCREENING FOR CARDIOVASCULAR DISORDERS: ICD-10-CM

## 2023-10-31 DIAGNOSIS — R00.2 PALPITATIONS: Primary | ICD-10-CM

## 2023-10-31 PROCEDURE — 93000 EKG 12-LEAD: ICD-10-PCS | Mod: S$GLB,,, | Performed by: INTERNAL MEDICINE

## 2023-10-31 PROCEDURE — 99204 PR OFFICE/OUTPT VISIT, NEW, LEVL IV, 45-59 MIN: ICD-10-PCS | Mod: 25,S$GLB,, | Performed by: INTERNAL MEDICINE

## 2023-10-31 PROCEDURE — 3078F DIAST BP <80 MM HG: CPT | Mod: CPTII,S$GLB,, | Performed by: INTERNAL MEDICINE

## 2023-10-31 PROCEDURE — 3008F BODY MASS INDEX DOCD: CPT | Mod: CPTII,S$GLB,, | Performed by: INTERNAL MEDICINE

## 2023-10-31 PROCEDURE — 99204 OFFICE O/P NEW MOD 45 MIN: CPT | Mod: 25,S$GLB,, | Performed by: INTERNAL MEDICINE

## 2023-10-31 PROCEDURE — 1160F PR REVIEW ALL MEDS BY PRESCRIBER/CLIN PHARMACIST DOCUMENTED: ICD-10-PCS | Mod: CPTII,S$GLB,, | Performed by: INTERNAL MEDICINE

## 2023-10-31 PROCEDURE — 1160F RVW MEDS BY RX/DR IN RCRD: CPT | Mod: CPTII,S$GLB,, | Performed by: INTERNAL MEDICINE

## 2023-10-31 PROCEDURE — 3078F PR MOST RECENT DIASTOLIC BLOOD PRESSURE < 80 MM HG: ICD-10-PCS | Mod: CPTII,S$GLB,, | Performed by: INTERNAL MEDICINE

## 2023-10-31 PROCEDURE — 99999 PR PBB SHADOW E&M-EST. PATIENT-LVL III: ICD-10-PCS | Mod: PBBFAC,,, | Performed by: INTERNAL MEDICINE

## 2023-10-31 PROCEDURE — 3008F PR BODY MASS INDEX (BMI) DOCUMENTED: ICD-10-PCS | Mod: CPTII,S$GLB,, | Performed by: INTERNAL MEDICINE

## 2023-10-31 PROCEDURE — 99999 PR PBB SHADOW E&M-EST. PATIENT-LVL III: CPT | Mod: PBBFAC,,, | Performed by: INTERNAL MEDICINE

## 2023-10-31 PROCEDURE — 3074F SYST BP LT 130 MM HG: CPT | Mod: CPTII,S$GLB,, | Performed by: INTERNAL MEDICINE

## 2023-10-31 PROCEDURE — 93000 ELECTROCARDIOGRAM COMPLETE: CPT | Mod: S$GLB,,, | Performed by: INTERNAL MEDICINE

## 2023-10-31 PROCEDURE — 1159F MED LIST DOCD IN RCRD: CPT | Mod: CPTII,S$GLB,, | Performed by: INTERNAL MEDICINE

## 2023-10-31 PROCEDURE — 1159F PR MEDICATION LIST DOCUMENTED IN MEDICAL RECORD: ICD-10-PCS | Mod: CPTII,S$GLB,, | Performed by: INTERNAL MEDICINE

## 2023-10-31 PROCEDURE — 3074F PR MOST RECENT SYSTOLIC BLOOD PRESSURE < 130 MM HG: ICD-10-PCS | Mod: CPTII,S$GLB,, | Performed by: INTERNAL MEDICINE

## 2023-10-31 NOTE — PROGRESS NOTES
HISTORY:    53-year-old female with no CV history presenting for initial evaluation by me.    Pt comes in to discuss a variety of symptoms.    Notes months of intermittent chest pain. Every few days, lasting seconds at a time. On days when she has it, it will occur multiple times throughout the day. Non-limiting. No clear triggers. Stable. Started on PPI, which helped slightly.    Months of palpitations. Occurs every few days. Feels like a fluttering sensation with a head rush. Transient lasting seconds.     Over the last few days does have a feeling of not being able to get a full breathe in as well as fatigue. No fevers or cough. Not associated with chest discomfort.     Activity levels excellent. Walks 4.5 miles/day without issue. 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    The patient denies any previous history of myocardial infarction, coronary artery disease, peripheral arterial disease, stroke, congestive heart failure, or cardiomyopathy.      PHYSICAL EXAM:    Vitals:    10/31/23 1154   BP: 113/78   Pulse: 73       NAD, A+Ox3.  No jvd, no bruit.  RRR nml s1,s2. No murmurs.  CTA B no wheezes or crackles.  No edema.    LABS/STUDIES (imaging reviewed during clinic visit):    October 2023 CBC normal.  May 2023 creatinine 0.6 with a BUN of 9.  2021  and HDL 89.  Triglycerides 101.  A1c and TSH normal.    ECG today and February 2023 sinus rhythm with no Q-waves or ST deviation.  PRWP.     ASSESSMENT & PLAN:    1. Palpitations    2. Chest pain, unspecified type    3. Encounter for screening for cardiovascular disorders        Orders Placed This Encounter    CT Calcium Scoring Cardiac    Holter monitor - 24 hour    IN OFFICE EKG 12-LEAD (to [facility])        Multiple symptoms that are unlikely to be an cardiac issue. Check coronary calcium score for risk stratification.     Palpitations, check holter.    Bps controlled. LDL reasonable. Low 10 year ASCVD risk. Good activity levels.    Follow up in about 4 months (around  2/29/2024).      Thang Barton MD

## 2023-11-06 ENCOUNTER — OFFICE VISIT (OUTPATIENT)
Dept: INTERNAL MEDICINE | Facility: CLINIC | Age: 53
End: 2023-11-06
Payer: COMMERCIAL

## 2023-11-06 VITALS
SYSTOLIC BLOOD PRESSURE: 108 MMHG | HEIGHT: 65 IN | OXYGEN SATURATION: 98 % | BODY MASS INDEX: 22.81 KG/M2 | WEIGHT: 136.88 LBS | HEART RATE: 80 BPM | DIASTOLIC BLOOD PRESSURE: 72 MMHG

## 2023-11-06 DIAGNOSIS — R06.02 SOB (SHORTNESS OF BREATH): Primary | ICD-10-CM

## 2023-11-06 PROCEDURE — 3008F BODY MASS INDEX DOCD: CPT | Mod: CPTII,S$GLB,, | Performed by: PHYSICIAN ASSISTANT

## 2023-11-06 PROCEDURE — 3074F PR MOST RECENT SYSTOLIC BLOOD PRESSURE < 130 MM HG: ICD-10-PCS | Mod: CPTII,S$GLB,, | Performed by: PHYSICIAN ASSISTANT

## 2023-11-06 PROCEDURE — 99214 PR OFFICE/OUTPT VISIT, EST, LEVL IV, 30-39 MIN: ICD-10-PCS | Mod: S$GLB,,, | Performed by: PHYSICIAN ASSISTANT

## 2023-11-06 PROCEDURE — 3078F PR MOST RECENT DIASTOLIC BLOOD PRESSURE < 80 MM HG: ICD-10-PCS | Mod: CPTII,S$GLB,, | Performed by: PHYSICIAN ASSISTANT

## 2023-11-06 PROCEDURE — 99999 PR PBB SHADOW E&M-EST. PATIENT-LVL III: CPT | Mod: PBBFAC,,, | Performed by: PHYSICIAN ASSISTANT

## 2023-11-06 PROCEDURE — 99214 OFFICE O/P EST MOD 30 MIN: CPT | Mod: S$GLB,,, | Performed by: PHYSICIAN ASSISTANT

## 2023-11-06 PROCEDURE — 3008F PR BODY MASS INDEX (BMI) DOCUMENTED: ICD-10-PCS | Mod: CPTII,S$GLB,, | Performed by: PHYSICIAN ASSISTANT

## 2023-11-06 PROCEDURE — 99999 PR PBB SHADOW E&M-EST. PATIENT-LVL III: ICD-10-PCS | Mod: PBBFAC,,, | Performed by: PHYSICIAN ASSISTANT

## 2023-11-06 PROCEDURE — 1159F MED LIST DOCD IN RCRD: CPT | Mod: CPTII,S$GLB,, | Performed by: PHYSICIAN ASSISTANT

## 2023-11-06 PROCEDURE — 1159F PR MEDICATION LIST DOCUMENTED IN MEDICAL RECORD: ICD-10-PCS | Mod: CPTII,S$GLB,, | Performed by: PHYSICIAN ASSISTANT

## 2023-11-06 PROCEDURE — 3074F SYST BP LT 130 MM HG: CPT | Mod: CPTII,S$GLB,, | Performed by: PHYSICIAN ASSISTANT

## 2023-11-06 PROCEDURE — 3078F DIAST BP <80 MM HG: CPT | Mod: CPTII,S$GLB,, | Performed by: PHYSICIAN ASSISTANT

## 2023-11-06 RX ORDER — POLYETHYLENE GLYCOL-3350 AND ELECTROLYTES WITH FLAVOR PACK 240; 5.84; 2.98; 6.72; 22.72 G/278.26G; G/278.26G; G/278.26G; G/278.26G; G/278.26G
4000 POWDER, FOR SOLUTION ORAL ONCE
COMMUNITY
Start: 2023-10-18

## 2023-11-06 NOTE — PROGRESS NOTES
"INTERNAL MEDICINE URGENT VISIT NOTE    CHIEF COMPLAINT     Chief Complaint   Patient presents with    Shortness of Breath    Fatigue       HPI     Genie Paul is a 53 y.o. female who presents for an urgent visit today.    PCP is Marva Lama DO, patient is known to me.     Patient presents with complaints of two weeks of "difficult to take a deep breath". She reports that she feels like she can't get enough air into her lungs- only when she tries to take a deep breath. She denies SOB on exertion. No coughing, no CHest pain. No lower extremity swelling or leg pain  She walks 4 miles daily without change in stamina however does report that when she stops, she feels like she can't fill her lungs completely.     No increased anxiety or stress  No URI symptoms    Saw cardiology last week has Holter and CT Ca Scoring scheduled. She reports that the cardiologist reassured her that these symptoms are not cardiac in nature.     Past Medical History:  History reviewed. No pertinent past medical history.    Home Medications:  Prior to Admission medications    Medication Sig Start Date End Date Taking? Authorizing Provider   lansoprazole (PREVACID) 30 MG capsule Take 1 capsule (30 mg total) by mouth once daily. 10/17/23 10/16/24 Yes Isamar Hidalgo NP   naltrexone (DEPADE) 50 mg tablet Take 50 mg by mouth.   Yes Provider, Historical   GAVILYTE-C 240-22.72-6.72 -5.84 gram SolR Take 4,000 mLs by mouth once. 10/18/23   Provider, Historical   varicella-zoster gE-AS01B, PF, (SHINGRIX, PF,) 50 mcg/0.5 mL injection Inject 0.5mL IM at 0 and 2-6 months 2/20/23   Marva Lama DO       Review of Systems:  Review of Systems   Constitutional:  Negative for chills and fever.   HENT:  Negative for ear pain, rhinorrhea, sore throat and trouble swallowing.    Eyes:  Negative for visual disturbance.   Respiratory:  Positive for shortness of breath. Negative for cough and wheezing.    Cardiovascular:  Negative for chest pain and leg " "swelling.   Gastrointestinal:  Negative for abdominal pain, constipation, diarrhea, nausea and vomiting.   Genitourinary:  Negative for dysuria and flank pain.   Musculoskeletal:  Negative for back pain, neck pain and neck stiffness.   Skin:  Negative for rash.   Neurological:  Negative for dizziness, syncope, weakness and headaches.   Psychiatric/Behavioral:  Negative for confusion.        Health Maintainence:   Immunizations:  Health Maintenance         Date Due Completion Date    TETANUS VACCINE Never done ---    Shingles Vaccine (1 of 2) Never done ---    Colorectal Cancer Screening 07/21/2023 7/21/2022    Influenza Vaccine (1) 09/01/2023 11/1/2022    COVID-19 Vaccine (8 - 2023-24 season) 09/01/2023 1/21/2022    Mammogram 11/22/2023 11/22/2022    Cervical Cancer Screening 11/01/2027 11/1/2022    Lipid Panel 08/16/2028 8/16/2023             PHYSICAL EXAM     /72 (BP Location: Left arm, Patient Position: Sitting, BP Method: Medium (Manual))   Pulse 80   Ht 5' 5" (1.651 m)   Wt 62.1 kg (136 lb 14.5 oz)   LMP 10/12/2023 (Approximate)   SpO2 98%   BMI 22.78 kg/m²     Physical Exam  Vitals and nursing note reviewed.   Constitutional:       Appearance: Normal appearance.      Comments: Healthy appearing female in NAD or apparent pain. She makes good eye contact, speaks in clear full sentences and ambulates with ease.          HENT:      Head: Normocephalic and atraumatic.      Nose: Nose normal.      Mouth/Throat:      Pharynx: Oropharynx is clear.   Eyes:      Conjunctiva/sclera: Conjunctivae normal.   Cardiovascular:      Rate and Rhythm: Normal rate and regular rhythm.      Pulses: Normal pulses.   Pulmonary:      Effort: No respiratory distress.   Abdominal:      Tenderness: There is no abdominal tenderness.   Musculoskeletal:         General: Normal range of motion.      Cervical back: No rigidity.   Skin:     General: Skin is warm and dry.      Capillary Refill: Capillary refill takes less than 2 " seconds.      Findings: No rash.   Neurological:      General: No focal deficit present.      Mental Status: She is alert.      Gait: Gait normal.   Psychiatric:         Mood and Affect: Mood normal.         LABS     Lab Results   Component Value Date    HGBA1C 4.6 07/09/2021     CMP  Sodium   Date Value Ref Range Status   05/11/2023 137 136 - 145 mmol/L Final     Potassium   Date Value Ref Range Status   05/11/2023 4.2 3.5 - 5.1 mmol/L Final     Chloride   Date Value Ref Range Status   05/11/2023 102 95 - 110 mmol/L Final     CO2   Date Value Ref Range Status   05/11/2023 23 23 - 29 mmol/L Final     Glucose   Date Value Ref Range Status   05/11/2023 91 70 - 110 mg/dL Final     BUN   Date Value Ref Range Status   05/11/2023 9 6 - 20 mg/dL Final     Creatinine   Date Value Ref Range Status   05/11/2023 0.6 0.5 - 1.4 mg/dL Final     Calcium   Date Value Ref Range Status   05/11/2023 10.0 8.7 - 10.5 mg/dL Final     Total Protein   Date Value Ref Range Status   05/11/2023 7.7 6.0 - 8.4 g/dL Final     Albumin   Date Value Ref Range Status   05/11/2023 4.5 3.5 - 5.2 g/dL Final     Total Bilirubin   Date Value Ref Range Status   05/11/2023 0.5 0.1 - 1.0 mg/dL Final     Comment:     For infants and newborns, interpretation of results should be based  on gestational age, weight and in agreement with clinical  observations.    Premature Infant recommended reference ranges:  Up to 24 hours.............<8.0 mg/dL  Up to 48 hours............<12.0 mg/dL  3-5 days..................<15.0 mg/dL  6-29 days.................<15.0 mg/dL       Alkaline Phosphatase   Date Value Ref Range Status   05/11/2023 46 (L) 55 - 135 U/L Final     AST   Date Value Ref Range Status   05/11/2023 18 10 - 40 U/L Final     ALT   Date Value Ref Range Status   05/11/2023 12 10 - 44 U/L Final     Anion Gap   Date Value Ref Range Status   05/11/2023 12 8 - 16 mmol/L Final     eGFR if    Date Value Ref Range Status   07/09/2021 >60 >60  mL/min/1.73 m^2 Final     eGFR if non    Date Value Ref Range Status   07/09/2021 >60 >60 mL/min/1.73 m^2 Final     Comment:     Calculation used to obtain the estimated glomerular filtration  rate (eGFR) is the CKD-EPI equation.        Lab Results   Component Value Date    WBC 8.03 10/17/2023    HGB 14.2 10/17/2023    HCT 43.7 10/17/2023    MCV 96 10/17/2023     10/17/2023     Lab Results   Component Value Date    CHOL 214 (H) 07/09/2021     Lab Results   Component Value Date    HDL 89 (H) 07/09/2021     Lab Results   Component Value Date    LDLCALC 104.8 07/09/2021     Lab Results   Component Value Date    TRIG 101 07/09/2021     Lab Results   Component Value Date    CHOLHDL 41.6 07/09/2021     Lab Results   Component Value Date    TSH 2.265 07/09/2021    [id number] 7.7 07/09/2021       ASSESSMENT/PLAN     Genie Paul is a 53 y.o. female     Genie was seen today for shortness of breath and fatigue. Unclear etiology, low suspicion for any acute thoracic pathology -will get xr and pft - will recommend pt follow-up with PCP for results review and symptom re-check.   Pt is aware of ED prompts.     Diagnoses and all orders for this visit:    SOB (shortness of breath)  -     Complete PFT w/ bronchodilator; Future  -     X-Ray Chest PA And Lateral; Future    Patient was counseled on when and how to seek emergent care.       Marni Nowak PA-C   Department of Internal Medicine - [facility]   11:38 AM     Answers submitted by the patient for this visit:  Shortness of Breath Questionnaire (Submitted on 11/5/2023)  Chief Complaint: Shortness of breath  Chronicity: new  Onset: 1 to 4 weeks ago  Frequency: every few minutes  Progression since onset: waxing and waning  claudication: No  coryza: No  hemoptysis: No  leg pain: No  orthopnea: Yes  PND: No  sputum production: No  swollen glands: No  syncope: No  Aggravating factors: nothing  Improvement on treatment: no relief  Risk factors for  DVT/PE: no known risk factors  Treatments tried: nothing  asthma: No  allergies: No  COPD: No  pneumonia: No  aspirin allergies: No  CAD: No  DVT: No  heart failure: No  PE: No  recent surgery: No  bronchiolitis: No  chronic lung disease: No

## 2023-11-07 ENCOUNTER — HOSPITAL ENCOUNTER (OUTPATIENT)
Dept: PULMONOLOGY | Facility: OTHER | Age: 53
Discharge: HOME | End: 2023-11-07
Attending: PHYSICIAN ASSISTANT
Payer: COMMERCIAL

## 2023-11-07 VITALS — HEART RATE: 55 BPM | RESPIRATION RATE: 16 BRPM

## 2023-11-07 DIAGNOSIS — R06.02 SOB (SHORTNESS OF BREATH): ICD-10-CM

## 2023-11-07 PROCEDURE — 94060 EVALUATION OF WHEEZING: CPT

## 2023-11-07 PROCEDURE — 94727 GAS DIL/WSHOT DETER LNG VOL: CPT | Mod: 26,,, | Performed by: INTERNAL MEDICINE

## 2023-11-07 PROCEDURE — 94727 GAS DIL/WSHOT DETER LNG VOL: CPT

## 2023-11-07 PROCEDURE — 25000242 PHARM REV CODE 250 ALT 637 W/ HCPCS: Performed by: PHYSICIAN ASSISTANT

## 2023-11-07 PROCEDURE — 94729 DIFFUSING CAPACITY: CPT | Mod: 26,,, | Performed by: INTERNAL MEDICINE

## 2023-11-07 PROCEDURE — 94727 PR PULM FUNCTION TEST BY GAS: ICD-10-PCS | Mod: 26,,, | Performed by: INTERNAL MEDICINE

## 2023-11-07 PROCEDURE — 94729 PR C02/MEMBANE DIFFUSE CAPACITY: ICD-10-PCS | Mod: 26,,, | Performed by: INTERNAL MEDICINE

## 2023-11-07 PROCEDURE — 94060 EVALUATION OF WHEEZING: CPT | Mod: 26,,, | Performed by: INTERNAL MEDICINE

## 2023-11-07 PROCEDURE — 94060 PR EVAL OF BRONCHOSPASM: ICD-10-PCS | Mod: 26,,, | Performed by: INTERNAL MEDICINE

## 2023-11-07 PROCEDURE — 94729 DIFFUSING CAPACITY: CPT

## 2023-11-07 RX ORDER — ALBUTEROL SULFATE 2.5 MG/.5ML
2.5 SOLUTION RESPIRATORY (INHALATION) ONCE
Status: COMPLETED | OUTPATIENT
Start: 2023-11-07 | End: 2023-11-07

## 2023-11-07 RX ADMIN — ALBUTEROL SULFATE 2.5 MG: 2.5 SOLUTION RESPIRATORY (INHALATION) at 04:11

## 2023-11-09 ENCOUNTER — HOSPITAL ENCOUNTER (OUTPATIENT)
Dept: RADIOLOGY | Facility: OTHER | Age: 53
Discharge: HOME | End: 2023-11-09
Attending: PHYSICIAN ASSISTANT
Payer: COMMERCIAL

## 2023-11-09 DIAGNOSIS — R06.02 SOB (SHORTNESS OF BREATH): ICD-10-CM

## 2023-11-09 PROCEDURE — 71046 X-RAY EXAM CHEST 2 VIEWS: CPT | Mod: 26,,, | Performed by: RADIOLOGY

## 2023-11-09 PROCEDURE — 71046 X-RAY EXAM CHEST 2 VIEWS: CPT | Mod: TC,FY

## 2023-11-09 PROCEDURE — 71046 XR CHEST PA AND LATERAL: ICD-10-PCS | Mod: 26,,, | Performed by: RADIOLOGY

## 2023-11-10 ENCOUNTER — HOSPITAL ENCOUNTER (OUTPATIENT)
Dept: RADIOLOGY | Facility: HOSPITAL | Age: 53
Discharge: HOME | End: 2023-11-10
Attending: INTERNAL MEDICINE
Payer: COMMERCIAL

## 2023-11-10 DIAGNOSIS — Z13.6 ENCOUNTER FOR SCREENING FOR CARDIOVASCULAR DISORDERS: ICD-10-CM

## 2023-11-10 PROCEDURE — 75571 CT CALCIUM SCORING CARDIAC: ICD-10-PCS | Mod: 26,,, | Performed by: RADIOLOGY

## 2023-11-10 PROCEDURE — 75571 CT HRT W/O DYE W/CA TEST: CPT | Mod: TC

## 2023-11-10 PROCEDURE — 75571 CT HRT W/O DYE W/CA TEST: CPT | Mod: 26,,, | Performed by: RADIOLOGY

## 2023-11-14 ENCOUNTER — OFFICE VISIT (OUTPATIENT)
Dept: INTERNAL MEDICINE | Facility: CLINIC | Age: 53
End: 2023-11-14
Payer: COMMERCIAL

## 2023-11-14 VITALS
WEIGHT: 138.56 LBS | RESPIRATION RATE: 18 BRPM | SYSTOLIC BLOOD PRESSURE: 112 MMHG | DIASTOLIC BLOOD PRESSURE: 70 MMHG | OXYGEN SATURATION: 99 % | HEIGHT: 65 IN | HEART RATE: 76 BPM | BODY MASS INDEX: 23.09 KG/M2

## 2023-11-14 DIAGNOSIS — F41.1 GENERALIZED ANXIETY DISORDER: Primary | ICD-10-CM

## 2023-11-14 DIAGNOSIS — F32.1 CURRENT MODERATE EPISODE OF MAJOR DEPRESSIVE DISORDER WITHOUT PRIOR EPISODE: ICD-10-CM

## 2023-11-14 PROCEDURE — 1159F PR MEDICATION LIST DOCUMENTED IN MEDICAL RECORD: ICD-10-PCS | Mod: CPTII,S$GLB,, | Performed by: INTERNAL MEDICINE

## 2023-11-14 PROCEDURE — 3008F BODY MASS INDEX DOCD: CPT | Mod: CPTII,S$GLB,, | Performed by: INTERNAL MEDICINE

## 2023-11-14 PROCEDURE — 3074F PR MOST RECENT SYSTOLIC BLOOD PRESSURE < 130 MM HG: ICD-10-PCS | Mod: CPTII,S$GLB,, | Performed by: INTERNAL MEDICINE

## 2023-11-14 PROCEDURE — 99999 PR PBB SHADOW E&M-EST. PATIENT-LVL III: ICD-10-PCS | Mod: PBBFAC,,, | Performed by: INTERNAL MEDICINE

## 2023-11-14 PROCEDURE — 3074F SYST BP LT 130 MM HG: CPT | Mod: CPTII,S$GLB,, | Performed by: INTERNAL MEDICINE

## 2023-11-14 PROCEDURE — 3078F PR MOST RECENT DIASTOLIC BLOOD PRESSURE < 80 MM HG: ICD-10-PCS | Mod: CPTII,S$GLB,, | Performed by: INTERNAL MEDICINE

## 2023-11-14 PROCEDURE — 1160F RVW MEDS BY RX/DR IN RCRD: CPT | Mod: CPTII,S$GLB,, | Performed by: INTERNAL MEDICINE

## 2023-11-14 PROCEDURE — 99999 PR PBB SHADOW E&M-EST. PATIENT-LVL III: CPT | Mod: PBBFAC,,, | Performed by: INTERNAL MEDICINE

## 2023-11-14 PROCEDURE — 1160F PR REVIEW ALL MEDS BY PRESCRIBER/CLIN PHARMACIST DOCUMENTED: ICD-10-PCS | Mod: CPTII,S$GLB,, | Performed by: INTERNAL MEDICINE

## 2023-11-14 PROCEDURE — 3078F DIAST BP <80 MM HG: CPT | Mod: CPTII,S$GLB,, | Performed by: INTERNAL MEDICINE

## 2023-11-14 PROCEDURE — 3008F PR BODY MASS INDEX (BMI) DOCUMENTED: ICD-10-PCS | Mod: CPTII,S$GLB,, | Performed by: INTERNAL MEDICINE

## 2023-11-14 PROCEDURE — 99214 PR OFFICE/OUTPT VISIT, EST, LEVL IV, 30-39 MIN: ICD-10-PCS | Mod: S$GLB,,, | Performed by: INTERNAL MEDICINE

## 2023-11-14 PROCEDURE — 99214 OFFICE O/P EST MOD 30 MIN: CPT | Mod: S$GLB,,, | Performed by: INTERNAL MEDICINE

## 2023-11-14 PROCEDURE — 1159F MED LIST DOCD IN RCRD: CPT | Mod: CPTII,S$GLB,, | Performed by: INTERNAL MEDICINE

## 2023-11-14 RX ORDER — PROPRANOLOL HYDROCHLORIDE 10 MG/1
TABLET ORAL
COMMUNITY
Start: 2023-11-07 | End: 2024-01-10

## 2023-11-14 NOTE — PROGRESS NOTES
"Subjective:      Patient ID: Genie Paul is a 53 y.o. female.    Chief Complaint: Results    54 yo female who is normally cared for by my colleague Dr. Lama and patient is new to me. I have reviewed patient's past medical, surgical, and social history in addition to MAR and allergies.      Patient presents today following testing that was obtained when she has had "difficulty with deep breath." She reports that she felt like she could not get enough air into her lungs. She has been to ED for palpitations, seen by cardiology and also underwent PFT which were all unremarkable.     We discussed results of her PFT testing today and her cardiac work up. She does report that she is doing better since she was started on lanzoprazole and is plannin aaliyah EGD on 1/23/2023. Would recommend follow up with cardiology if continued palpitations. Discussed anxiety as a possible cause of her sx as well.     All questions answered    Denies any chest pain, shortness of breath, nausea vomiting constipation diarrhea, blood in stool, heartburn    Review of Systems   Constitutional:  Negative for chills, fever and weight loss.   HENT:  Negative for congestion, ear pain and sore throat.    Eyes:  Negative for double vision.   Respiratory:  Negative for cough and shortness of breath.    Cardiovascular:  Negative for chest pain, palpitations and leg swelling.   Gastrointestinal:  Negative for abdominal pain, heartburn, nausea and vomiting.   Skin:  Negative for rash.   Neurological:  Negative for dizziness, tingling and headaches.   Psychiatric/Behavioral:  Negative for depression.          Current Outpatient Medications:     lansoprazole (PREVACID) 30 MG capsule, Take 1 capsule (30 mg total) by mouth once daily., Disp: 30 capsule, Rfl: 11    propranoloL (INDERAL) 10 MG tablet, , Disp: , Rfl:     GAVILYTE-C 240-22.72-6.72 -5.84 gram SolR, Take 4,000 mLs by mouth once., Disp: , Rfl:     naltrexone (DEPADE) 50 mg tablet, Take 50 mg by " "mouth., Disp: , Rfl:     varicella-zoster gE-AS01B, PF, (SHINGRIX, PF,) 50 mcg/0.5 mL injection, Inject 0.5mL IM at 0 and 2-6 months (Patient not taking: Reported on 11/14/2023), Disp: 1 each, Rfl: 1    Lab Results   Component Value Date    HGBA1C 4.6 07/09/2021     No results found for: "MICALBCREAT"  Lab Results   Component Value Date    LDLCALC 104.8 07/09/2021    CHOL 214 (H) 07/09/2021    HDL 89 (H) 07/09/2021    TRIG 101 07/09/2021       Lab Results   Component Value Date     05/11/2023    K 4.2 05/11/2023     05/11/2023    CO2 23 05/11/2023    GLU 91 05/11/2023    BUN 9 05/11/2023    CREATININE 0.6 05/11/2023    CALCIUM 10.0 05/11/2023    PROT 7.7 05/11/2023    ALBUMIN 4.5 05/11/2023    BILITOT 0.5 05/11/2023    ALKPHOS 46 (L) 05/11/2023    AST 18 05/11/2023    ALT 12 05/11/2023    ANIONGAP 12 05/11/2023    ESTGFRAFRICA >60 07/09/2021    EGFRNONAA >60 07/09/2021    WBC 8.03 10/17/2023    HGB 14.2 10/17/2023    HGB 14.3 05/11/2023    HCT 43.7 10/17/2023    MCV 96 10/17/2023     10/17/2023    TSH 2.265 07/09/2021    HEPCAB Negative 10/03/2022       Lab Results   Component Value Date    VBDTBBRA46AS 22 (L) 07/09/2021    [id number] 878 10/03/2022         History reviewed. No pertinent past medical history.  Past Surgical History:   Procedure Laterality Date    EYE SURGERY  not sure    laser surgery for holes     Social History     Social History Narrative    Single. Lives alone. 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 No children. Suffers from alcohol dependency.      Family History   Problem Relation Age of Onset    Cancer Mother         uterine possible unsure    Autoimmune disease Mother     Hearing loss Father     Alcohol abuse Brother     Cancer Brother         melanoma    Early death Brother     Cancer Maternal Aunt     Cancer Maternal Uncle     Stroke Maternal Grandmother     Cancer Maternal Grandfather     Breast cancer Other     Colon cancer Neg Hx     Ovarian " "cancer Neg Hx      Vitals:    11/14/23 1317   BP: 112/70   Pulse: 76   Resp: 18   SpO2: 99%   Weight: 62.9 kg (138 lb 9 oz)   Height: 5' 5" (1.651 m)   PainSc: 0-No pain     Objective:   Physical Exam  Vitals reviewed.   Constitutional:       Appearance: Normal appearance.   HENT:      Head: Normocephalic.   Eyes:      Pupils: Pupils are equal, round, and reactive to light.   Cardiovascular:      Rate and Rhythm: Normal rate.      Pulses: Normal pulses.      Heart sounds: Normal heart sounds.   Pulmonary:      Effort: Pulmonary effort is normal.      Breath sounds: Normal breath sounds.   Abdominal:      General: Bowel sounds are normal.      Palpations: Abdomen is soft.   Musculoskeletal:         General: Normal range of motion.      Cervical back: Normal range of motion.   Skin:     General: Skin is warm.   Neurological:      General: No focal deficit present.      Mental Status: She is alert. Mental status is at baseline.   Psychiatric:         Mood and Affect: Mood normal.       Assessment:     1. Generalized anxiety disorder    2. Current moderate episode of major depressive disorder without prior episode      Plan:   - All questions during the encounter was answered fully  - reviewed labs and procedures that was obtained prior to the evaluation today       There are no Patient Instructions on file for this visit.  Tests to Keep You Healthy    Mammogram: Met on 11/22/2022  Colon Cancer Screening: SCHEDULED  Cervical Cancer Screening: Met on 11/1/2022                            "

## 2024-01-10 ENCOUNTER — CLINICAL SUPPORT (OUTPATIENT)
Dept: AUDIOLOGY | Facility: CLINIC | Age: 54
End: 2024-01-10
Payer: COMMERCIAL

## 2024-01-10 ENCOUNTER — OFFICE VISIT (OUTPATIENT)
Dept: OTOLARYNGOLOGY | Facility: CLINIC | Age: 54
End: 2024-01-10
Payer: COMMERCIAL

## 2024-01-10 VITALS — SYSTOLIC BLOOD PRESSURE: 121 MMHG | HEART RATE: 86 BPM | DIASTOLIC BLOOD PRESSURE: 75 MMHG

## 2024-01-10 DIAGNOSIS — H53.9 VISUAL CHANGES: ICD-10-CM

## 2024-01-10 DIAGNOSIS — H93.293 ABNORMAL AUDITORY PERCEPTION OF BOTH EARS: ICD-10-CM

## 2024-01-10 DIAGNOSIS — R09.81 NASAL CONGESTION: ICD-10-CM

## 2024-01-10 DIAGNOSIS — J34.89 SINUS PRESSURE: ICD-10-CM

## 2024-01-10 DIAGNOSIS — R42 DIZZINESS: Primary | ICD-10-CM

## 2024-01-10 DIAGNOSIS — R42 LIGHTHEADEDNESS: Primary | ICD-10-CM

## 2024-01-10 DIAGNOSIS — J34.2 NASAL SEPTAL DEVIATION: ICD-10-CM

## 2024-01-10 PROCEDURE — 3078F DIAST BP <80 MM HG: CPT | Mod: CPTII,S$GLB,, | Performed by: OTOLARYNGOLOGY

## 2024-01-10 PROCEDURE — 1159F MED LIST DOCD IN RCRD: CPT | Mod: CPTII,S$GLB,, | Performed by: OTOLARYNGOLOGY

## 2024-01-10 PROCEDURE — 3074F SYST BP LT 130 MM HG: CPT | Mod: CPTII,S$GLB,, | Performed by: OTOLARYNGOLOGY

## 2024-01-10 PROCEDURE — 99999 PR PBB SHADOW E&M-EST. PATIENT-LVL III: CPT | Mod: PBBFAC,,, | Performed by: OTOLARYNGOLOGY

## 2024-01-10 PROCEDURE — 92557 COMPREHENSIVE HEARING TEST: CPT | Mod: S$GLB,,,

## 2024-01-10 PROCEDURE — 1160F RVW MEDS BY RX/DR IN RCRD: CPT | Mod: CPTII,S$GLB,, | Performed by: OTOLARYNGOLOGY

## 2024-01-10 PROCEDURE — 92567 TYMPANOMETRY: CPT | Mod: S$GLB,,,

## 2024-01-10 PROCEDURE — 99204 OFFICE O/P NEW MOD 45 MIN: CPT | Mod: S$GLB,,, | Performed by: OTOLARYNGOLOGY

## 2024-01-10 PROCEDURE — 99999 PR PBB SHADOW E&M-EST. PATIENT-LVL II: CPT | Mod: PBBFAC,,,

## 2024-01-10 NOTE — PROGRESS NOTES
53-year-old female who has been having trouble with dizziness sensations since at least May of 2023.  She does not recall any preceding viral illness, change in medication, or head or ear trauma.  She gets the sensation of some movement or feeling dizzy that seems to be worse when she is dancing.  She is an instructor for line dancing.  She did has not noticed any associated otologic symptoms of hearing, change ear pressure, or ringing of the ear.  She has not had any falls but at times is very concerned because of the sensation she is having.  She has not had a history of migraine.  She does have a history of some recurrent sinus drainage and pressure.  She sometimes gets sinus headaches . No migraine HA history  She felt over the past month or so that she was may be improving but then feels quite dizzy today.  She admits that her sinus congestion and nasal congestion have increased today.  She admits to visual changes with eye prescription adjustments over the past couple years. She has returned to eye doctor a few times with c/o not seeing well. She now wears trifocals (no line). She uses them while dancing. Does not really feel acute spinning. Just feels off, dizzy, some movement. She has noticed flashing lights while dancing in bars makes her feel worse.  She has had increase in motion sickness issues. She likely would not be able to ride on amusement rides any longer. She has danced her whole life and not stopped. She had a propensity for great turns. Her troubles now make her worried she may not be able to continue dancing. Her father has hearing loss. Her father has some dizziness but is also on a lot of meds. No other family hx of vertigo or dizziness. No head imaging.    She used to get numerous sinus infections per year that require antibiotics.  She reports having acupuncture which did seem to less than the number of infections she had.  She will still get some intermittent sinus pressure and drainage.   She has been recommended to use Flonase in the past but is not using it currently.  She is wondering whether straightening the inside of her nose would.    No ear surgery, ear trauma, head trauma, ear drainage, unilateral tinnitus, unilateral ear pressure, acute hearing loss associated with episodes of dizziness.      PMHx, PSHx, Meds, Allergies, SocHx, FamHx reviewed in Epic and updated appropriate to this visit    Review of Systems     Constitutional: Positive for fatigue.  Negative for fever.      HENT: Positive for hearing loss, postnasal drip, sinus pressure and sore throat.  Negative for ear discharge, ear pain, runny nose, sinus infection, stuffy nose, trouble swallowing and voice change.      Eyes:  Positive for eye itching. Negative for eye drainage.     Respiratory:  Positive for shortness of breath. Negative for cough and wheezing.      Cardiovascular:  Positive for chest pain. Negative for foot swelling.     Gastrointestinal:  Positive for acid reflux and heartburn. Negative for abdominal pain.     Genitourinary: Negative for difficulty urinating, sexual problems and frequent urination.     Musc: Negative for aching joints, aching muscles, back pain and neck pain.     Skin: Negative for rash.     Allergy: Negative for food allergies and seasonal allergies.     Endocrine: Negative for cold intolerance and heat intolerance.      Neurological: Positive for dizziness and light-headedness. Negative for headaches.      Hematologic: Negative for bruises/bleeds easily and swollen glands.      Psychiatric: Positive for decreased concentration and nervous/anxious. Negative for depression.        PE: /75   Pulse 86   Gen: female, WN, WD, NAD, cooperative and alert, good historian  Eyes: no spontaneous nystagmus, PERRL  Ears: patent EAC w/o cerumen with translucent TMs bilaterally, normal bony landmarks  Nose: external wnl, septum some deviation extending inferiorly to columella, turbinates - inferior boggy,  clear drainage, no visible polyps  OC/OP: tongue midline, teeth in good repair, MMM, tonsils wnl, no erythema or exudate  Neck: no LAD or masses, no bruits  Face: no TTP over sinuses  Chest: equal chest excursion, no retrations  Skin: no visible concerning lesions of face, dry and intact  Lymph: no neck LAD  Neuro: CN II-VII, IX - XII intact, finger to nose testing brisk and intact, EOM intact (but eye movement makes her feel dizzy), Romberg negative, no visible spontaneous nystagmus, no ataxia, Fukuda test negative, head thrust negative  Psych: alert & oriented x 3, reasonable, normal affect      Audio    Audiogram reviewed with patient - normal hearing thresholds.    Impression:   1. Dizziness        2. Visual changes        3. Sinus pressure        4. Nasal septal deviation        5. Nasal congestion            Discussion and Plan:    Extensively reviewed potential causes of dizziness and vertigo. Reviewed factors that may be related to her symptoms (including visual changes and trifocal glasses).  Recommend scheduling a VNG (video nystagmogram). Scheduled while patient was in office. For changes call [phone]. Instructions for VNG testing provided (in AVS and handout). Pending results, further recommendations may be provided.    For nasal and sinus symptoms recommend use daily Flonase or Nasacort 2 sprays each nostril daily. We discussed how to apply it appropriately by placing the spray nozzle inside the nostril pointing above the ear on the same side and gently breathing in through the nose (do NOT snort).    Another hearing test in a year.      Parts or all of this note were created by voice recognition software; typographical errors in translating may be present.

## 2024-01-10 NOTE — PATIENT INSTRUCTIONS
Extensively reviewed potential causes of dizziness and vertigo. Reviewed factors that may be related to her symptoms (including visual changes and trifocal glasses).  Recommend scheduling a VNG (video nystagmogram). Scheduled while patient was in office. For changes call [phone]. Instructions for VNG testing provided (see below). Pending results, further recommendations may be provided.    For nasal and sinus symptoms recommend use daily Flonase or Nasacort 2 sprays each nostril daily. We discussed how to apply it appropriately by placing the spray nozzle inside the nostril pointing above the ear on the same side and gently breathing in through the nose (do NOT snort).    Another hearing test in a year or sooner for perceived change.      INSTRUCTIONS FOR VNG testing:  Certain things and medications may affect the results of the VNG test.    Do NOT wear makeup (especially eyeliner or mascara)    Do NOT take any of the following medications for 48 hours prior to test: sleeping pills, tranquilizers or anti-anxiety meds (such as valium, librium, xanax), anti-histamines or over the counter cold medications, anti-dizzy medications (antivert (meclizine), dramamine),  or muscle relaxers (such as flexeril).     Seizure medications (Dilantin, phenobarbital, etc) interfere with the accuracy of the test. To obtain a more accurate test they should be stopped BUT ONLY AFTER CLEARANCE FROM THE PHYSICIAN WHO PRESCRIBED THE MEDICATION.    Heart and/or blood pressure medications are allowed.    Do NOT eat or drink anything other than small sips of water for 4 hours prior to the VNG.    Do NOT drink alcoholic beverages for 24 hours prior to the test.    If you are diabetic, please inform the  when booking your appointment. Please schedule a time that would make fasting for 4 hours most convenient to your medication routine. If you have any concerns, check with your primary care physician.

## 2024-01-10 NOTE — PROGRESS NOTES
Genie Paul was seen today in the clinic for an audiologic evaluation.  Patient's main complaint was lightheadedness and imbalance that worsens after going dancing.  Ms. Paul reported that these episodes tend to last hours to days. She denied otalgia, aural pressure and tinnitus.     Tympanometry revealed Type A in the right ear and Type A in the left ear.     Audiogram results revealed normal hearing sensitivity bilaterally.      Speech reception thresholds were noted at 15 dB in the right ear and 15 dB in the left ear.    Speech discrimination scores were 100% in the right ear and 100% in the left ear.    Recommendations:  Otologic evaluation  Repeat audiogram as needed  Hearing protection when in noise

## 2024-01-19 ENCOUNTER — ANESTHESIA EVENT (OUTPATIENT)
Dept: ENDOSCOPY | Facility: HOSPITAL | Age: 54
End: 2024-01-19
Payer: COMMERCIAL

## 2024-01-19 ENCOUNTER — HOSPITAL ENCOUNTER (OUTPATIENT)
Dept: RADIOLOGY | Facility: OTHER | Age: 54
Discharge: HOME | End: 2024-01-19
Attending: FAMILY MEDICINE
Payer: COMMERCIAL

## 2024-01-19 DIAGNOSIS — Z12.31 ENCOUNTER FOR SCREENING MAMMOGRAM FOR BREAST CANCER: ICD-10-CM

## 2024-01-19 PROCEDURE — 77067 SCR MAMMO BI INCL CAD: CPT | Mod: 26,,, | Performed by: RADIOLOGY

## 2024-01-19 PROCEDURE — 77067 SCR MAMMO BI INCL CAD: CPT | Mod: TC

## 2024-01-19 PROCEDURE — 77063 BREAST TOMOSYNTHESIS BI: CPT | Mod: 26,,, | Performed by: RADIOLOGY

## 2024-01-19 NOTE — ANESTHESIA PREPROCEDURE EVALUATION
01/19/2024  Genie Paul is a 53 y.o., female.  Past Medical History:   Diagnosis Date    GERD (gastroesophageal reflux disease)     Hearing loss      Past Surgical History:   Procedure Laterality Date    EYE SURGERY  not sure    laser surgery for holes         Pre-op Assessment    I have reviewed the Patient Summary Reports.     I have reviewed the Nursing Notes.    I have reviewed the Medications.     Review of Systems  Social:  Alcohol Use       Hematology/Oncology:  Hematology Normal   Oncology Normal                                   EENT/Dental:  EENT/Dental Normal           Cardiovascular:  Cardiovascular Normal                                            Pulmonary:  Pulmonary Normal                       Renal/:  Renal/ Normal                 Hepatic/GI:     GERD             Musculoskeletal:  Musculoskeletal Normal                Neurological:  Neurology Normal                                      Endocrine:  Endocrine Normal            Dermatological:  Skin Normal    Psych:  Psychiatric Normal                    Physical Exam  General: Well nourished, Cooperative, Alert and Oriented    Airway:  TM Distance: Normal  Tongue: Normal  Neck ROM: Normal ROM    Chest/Lungs:  Clear to auscultation    Heart:  Rate: Normal    Abdomen:  Normal        Anesthesia Plan  Type of Anesthesia, risks & benefits discussed:    Anesthesia Type: Gen Natural Airway  Intra-op Monitoring Plan: Standard ASA Monitors  Induction:  IV  Informed Consent: Informed consent signed with the Patient and all parties understand the risks and agree with anesthesia plan.  All questions answered. Patient consented to blood products? No  ASA Score: 2  Day of Surgery Review of History & Physical: H&P Update referred to the surgeon/provider.    Ready For Surgery From Anesthesia Perspective.     .

## 2024-01-22 ENCOUNTER — TELEPHONE (OUTPATIENT)
Dept: ENDOSCOPY | Facility: HOSPITAL | Age: 54
End: 2024-01-22
Payer: COMMERCIAL

## 2024-01-22 NOTE — TELEPHONE ENCOUNTER
Outgoing call  Was not able to speak with patient left voicemail asking patient to give us a call back  Information confirmed:   Date of procedure:  1/23/2024  Arrival time: 12:15 am  Procedure (s): colonoscopy

## 2024-01-23 ENCOUNTER — HOSPITAL ENCOUNTER (OUTPATIENT)
Facility: HOSPITAL | Age: 54
Discharge: HOME | End: 2024-01-23
Attending: INTERNAL MEDICINE | Admitting: INTERNAL MEDICINE
Payer: COMMERCIAL

## 2024-01-23 ENCOUNTER — ANESTHESIA (OUTPATIENT)
Dept: ENDOSCOPY | Facility: HOSPITAL | Age: 54
End: 2024-01-23
Payer: COMMERCIAL

## 2024-01-23 VITALS
TEMPERATURE: 98 F | RESPIRATION RATE: 18 BRPM | OXYGEN SATURATION: 98 % | DIASTOLIC BLOOD PRESSURE: 62 MMHG | HEART RATE: 67 BPM | WEIGHT: 130 LBS | HEIGHT: 65 IN | BODY MASS INDEX: 21.66 KG/M2 | SYSTOLIC BLOOD PRESSURE: 112 MMHG

## 2024-01-23 DIAGNOSIS — K21.9 GERD (GASTROESOPHAGEAL REFLUX DISEASE): ICD-10-CM

## 2024-01-23 DIAGNOSIS — Z12.11 COLON CANCER SCREENING: ICD-10-CM

## 2024-01-23 DIAGNOSIS — K21.9 GASTROESOPHAGEAL REFLUX DISEASE, UNSPECIFIED WHETHER ESOPHAGITIS PRESENT: Primary | ICD-10-CM

## 2024-01-23 LAB
B-HCG UR QL: NEGATIVE
CTP QC/QA: YES

## 2024-01-23 PROCEDURE — 37000009 HC ANESTHESIA EA ADD 15 MINS: Performed by: INTERNAL MEDICINE

## 2024-01-23 PROCEDURE — 25000003 PHARM REV CODE 250: Performed by: NURSE ANESTHETIST, CERTIFIED REGISTERED

## 2024-01-23 PROCEDURE — 88305 TISSUE EXAM BY PATHOLOGIST: CPT | Mod: 26,,, | Performed by: STUDENT IN AN ORGANIZED HEALTH CARE EDUCATION/TRAINING PROGRAM

## 2024-01-23 PROCEDURE — D9220A PRA ANESTHESIA: Mod: ,,, | Performed by: NURSE ANESTHETIST, CERTIFIED REGISTERED

## 2024-01-23 PROCEDURE — 27201012 HC FORCEPS, HOT/COLD, DISP: Performed by: INTERNAL MEDICINE

## 2024-01-23 PROCEDURE — 37000008 HC ANESTHESIA 1ST 15 MINUTES: Performed by: INTERNAL MEDICINE

## 2024-01-23 PROCEDURE — 88342 IMHCHEM/IMCYTCHM 1ST ANTB: CPT | Mod: 26,,, | Performed by: STUDENT IN AN ORGANIZED HEALTH CARE EDUCATION/TRAINING PROGRAM

## 2024-01-23 PROCEDURE — 63600175 PHARM REV CODE 636 W HCPCS: Performed by: NURSE ANESTHETIST, CERTIFIED REGISTERED

## 2024-01-23 PROCEDURE — 88305 TISSUE EXAM BY PATHOLOGIST: CPT | Performed by: STUDENT IN AN ORGANIZED HEALTH CARE EDUCATION/TRAINING PROGRAM

## 2024-01-23 PROCEDURE — 43239 EGD BIOPSY SINGLE/MULTIPLE: CPT | Mod: ,,, | Performed by: INTERNAL MEDICINE

## 2024-01-23 PROCEDURE — 99900035 HC TECH TIME PER 15 MIN (STAT)

## 2024-01-23 PROCEDURE — 43239 EGD BIOPSY SINGLE/MULTIPLE: CPT | Performed by: INTERNAL MEDICINE

## 2024-01-23 PROCEDURE — 94761 N-INVAS EAR/PLS OXIMETRY MLT: CPT

## 2024-01-23 PROCEDURE — 88342 IMHCHEM/IMCYTCHM 1ST ANTB: CPT | Performed by: STUDENT IN AN ORGANIZED HEALTH CARE EDUCATION/TRAINING PROGRAM

## 2024-01-23 PROCEDURE — 81025 URINE PREGNANCY TEST: CPT | Performed by: INTERNAL MEDICINE

## 2024-01-23 RX ORDER — PROPOFOL 10 MG/ML
VIAL (ML) INTRAVENOUS CONTINUOUS PRN
Status: DISCONTINUED | OUTPATIENT
Start: 2024-01-23 | End: 2024-01-23

## 2024-01-23 RX ORDER — SODIUM CHLORIDE 9 MG/ML
INJECTION, SOLUTION INTRAVENOUS CONTINUOUS
Status: DISCONTINUED | OUTPATIENT
Start: 2024-01-23 | End: 2024-01-23 | Stop reason: HOSPADM

## 2024-01-23 RX ORDER — LIDOCAINE HYDROCHLORIDE 20 MG/ML
INJECTION INTRAVENOUS
Status: DISCONTINUED | OUTPATIENT
Start: 2024-01-23 | End: 2024-01-23

## 2024-01-23 RX ADMIN — PROPOFOL 175 MCG/KG/MIN: 10 INJECTION, EMULSION INTRAVENOUS at 01:01

## 2024-01-23 RX ADMIN — PROPOFOL 90 MG: 10 INJECTION, EMULSION INTRAVENOUS at 01:01

## 2024-01-23 RX ADMIN — BENZOCAINE 1 EACH: 200 SPRAY DENTAL; ORAL; PERIODONTAL at 01:01

## 2024-01-23 RX ADMIN — GLYCOPYRROLATE 0.2 MG: 0.2 INJECTION, SOLUTION INTRAMUSCULAR; INTRAVENOUS at 01:01

## 2024-01-23 RX ADMIN — SODIUM CHLORIDE: 0.9 INJECTION, SOLUTION INTRAVENOUS at 01:01

## 2024-01-23 RX ADMIN — LIDOCAINE HYDROCHLORIDE 100 MG: 20 INJECTION INTRAVENOUS at 01:01

## 2024-01-23 NOTE — PLAN OF CARE
Discharge instructions given to patient/   and she verbalized understanding of all.  Written report of procedure provided by MD and given to patient. MD came to bedside and gave report of findings. VSS, denies n/v and tolerating PO, rates pain level tolerable. IV removed, and family notified for patient discharge home.

## 2024-01-23 NOTE — PLAN OF CARE
Pt arrived to recovery dosc via stretcher per ENDO team. Bedside report received. Pt attached to bedside monitor. VSS. Pt sedated post procedure. Pt on room air; oxygen sats _99___%. Pt IV access removed.  Warm blanket applied.Will continue to monitor.

## 2024-01-23 NOTE — ANESTHESIA POSTPROCEDURE EVALUATION
Anesthesia Post Evaluation    Patient: Genie Paul    Procedure(s) Performed: Procedure(s) (LRB):  EGD (ESOPHAGOGASTRODUODENOSCOPY) (N/A)    Final Anesthesia Type: general      Patient location during evaluation: GI PACU  Patient participation: Yes- Able to Participate  Level of consciousness: awake and alert  Post-procedure vital signs: reviewed and stable  Pain management: adequate  Airway patency: patent    PONV status at discharge: No PONV  Anesthetic complications: no      Cardiovascular status: blood pressure returned to baseline and hemodynamically stable  Respiratory status: unassisted  Hydration status: euvolemic  Follow-up not needed.              Vitals Value Taken Time   /62 01/23/24 1415     01/23/24 1423   Pulse 62 01/23/24 1421   Resp 19 01/23/24 1421   SpO2 100 % 01/23/24 1421   Vitals shown include unvalidated device data.      Event Time   Out of Recovery 14:05:00         Pain/Diane Score: Diane Score: 10 (1/23/2024  2:15 PM)

## 2024-01-23 NOTE — H&P
Short Stay Endoscopy History and Physical    PCP - Marva Lama, DO    Procedure - EGD  Sedation: GA  ASA - per anesthesia  Mallampati - per anesthesia  History of Anesthesia problems - no  Family history Anesthesia problems -  no     HPI:  This is a 53 y.o. female here for evaluation of : GERD   Reflux - yes  Dysphagia - no  Abdominal pain - no  Diarrhea - no    ROS:  Constitutional: No fevers, chills, No weight loss  ENT: No allergies  CV: No chest pain  Pulm: No cough, No shortness of breath  Ophtho: No vision changes  GI: see HPI  Medical History:  has a past medical history of GERD (gastroesophageal reflux disease) and Hearing loss.    Surgical History:  has a past surgical history that includes Eye surgery (not sure).    Family History: family history includes Alcohol abuse in her brother; Autoimmune disease in her mother; Breast cancer in an other family member; Cancer in her brother, maternal aunt, maternal grandfather, maternal uncle, and mother; Early death in her brother; Hearing loss in her father; Stroke in her maternal grandmother.. Otherwise no colon cancer, inflammatory bowel disease, or GI malignancies.    Social History:  reports that she has never smoked. She has never been exposed to tobacco smoke. She does not have any smokeless tobacco history on file. She reports current alcohol use of about 7.0 standard drinks of alcohol per week. She reports that she does not use drugs.    Review of patient's allergies indicates:  No Known Allergies    Medications:   Medications Prior to Admission   Medication Sig Dispense Refill Last Dose    lansoprazole (PREVACID) 30 MG capsule Take 1 capsule (30 mg total) by mouth once daily. 30 capsule 11 Past Week    naltrexone (DEPADE) 50 mg tablet Take 50 mg by mouth.   Past Week    GAVILYTE-C 240-22.72-6.72 -5.84 gram SolR Take 4,000 mLs by mouth once.   Unknown    varicella-zoster gE-AS01B, PF, (SHINGRIX, PF,) 50 mcg/0.5 mL injection Inject 0.5mL IM at 0 and 2-6  months 1 each 1 Unknown       Objective Findings:    Vital Signs: Per nursing notes.    Physical Exam:  General Appearance: Well appearing in no acute distress  Head:   Normocephalic, without obvious abnormality  Eyes:    No scleral icterus  Airway: Open  Neck: No restriction in mobility  Lungs: CTA bilaterally in anterior and posterior fields, no wheezes, no crackles.  Heart:  Regular rate and rhythm, S1, S2 normal, no murmurs heard  Abdomen: Soft, non tender, non distended      Labs:  Lab Results   Component Value Date    WBC 8.03 10/17/2023    HGB 14.2 10/17/2023    HCT 43.7 10/17/2023     10/17/2023    CHOL 214 (H) 07/09/2021    TRIG 101 07/09/2021    HDL 89 (H) 07/09/2021    ALT 12 05/11/2023    AST 18 05/11/2023     05/11/2023    K 4.2 05/11/2023     05/11/2023    CREATININE 0.6 05/11/2023    BUN 9 05/11/2023    CO2 23 05/11/2023    TSH 2.265 07/09/2021    HGBA1C 4.6 07/09/2021         I have explained the risks and benefits of endoscopy procedures to the patient including but not limited to bleeding, perforation, infection, and death.    Thank you so much for allowing me to participate in the care of Genie Kovacs MD

## 2024-01-23 NOTE — TRANSFER OF CARE
"Anesthesia Transfer of Care Note    Patient: Genie Paul    Procedure(s) Performed: Procedure(s) (LRB):  EGD (ESOPHAGOGASTRODUODENOSCOPY) (N/A)    Patient location: PACU    Anesthesia Type: general    Transport from OR: Transported from OR on room air with adequate spontaneous ventilation    Post pain: adequate analgesia    Post assessment: no apparent anesthetic complications    Post vital signs: stable    Level of consciousness: awake    Nausea/Vomiting: no nausea/vomiting    Complications: none    Transfer of care protocol was followed      Last vitals: Visit Vitals  BP (!) 103/53 (BP Location: Left arm, Patient Position: Sitting)   Pulse 70   Temp 36.5 °C (97.7 °F)   Resp 18   Ht 5' 5" (1.651 m)   Wt 59 kg (130 lb)   LMP 01/06/2024 (Exact Date)   SpO2 100%   Breastfeeding No   BMI 21.63 kg/m²     "

## 2024-01-23 NOTE — PROVATION PATIENT INSTRUCTIONS
Discharge Summary/Instructions after an Endoscopic Procedure  Patient Name: Genie Paul  Patient MRN: [id number]  Patient YOB: 1970 Tuesday, January 23, 2024  Chai Kovacs MD  Dear patient,  As a result of recent federal legislation (The Federal Cures Act), you may   receive lab or pathology results from your procedure in your [facility]   account before your physician is able to contact you. Your physician or   their representative will relay the results to you with their   recommendations at their soonest availability.  Thank you,  RESTRICTIONS:  During your procedure today, you received medications for sedation.  These   medications may affect your judgment, balance and coordination.  Therefore,   for 24 hours, you have the following restrictions:   - DO NOT drive a car, operate machinery, make legal/financial decisions,   sign important papers or drink alcohol.    ACTIVITY:  Today: no heavy lifting, straining or running due to procedural   sedation/anesthesia.  The following day: return to full activity including work.  DIET:  Eat and drink normally unless instructed otherwise.     TREATMENT FOR COMMON SIDE EFFECTS:  - Mild abdominal pain, nausea, belching, bloating or excessive gas:  rest,   eat lightly and use a heating pad.  - Sore Throat: treat with throat lozenges and/or gargle with warm salt   water.  - Because air was used during the procedure, expelling large amounts of air   from your rectum or belching is normal.  - If a bowel prep was taken, you may not have a bowel movement for 1-3 days.    This is normal.  SYMPTOMS TO WATCH FOR AND REPORT TO YOUR PHYSICIAN:  1. Abdominal pain or bloating, other than gas cramps.  2. Chest pain.  3. Back pain.  4. Signs of infection such as: chills or fever occurring within 24 hours   after the procedure.  5. Rectal bleeding, which would show as bright red, maroon, or black stools.   (A tablespoon of blood from the rectum is not serious, especially if    hemorrhoids are present.)  6. Vomiting.  7. Weakness or dizziness.  GO DIRECTLY TO THE NEAREST EMERGENCY ROOM IF YOU HAVE ANY OF THE FOLLOWING:      Difficulty breathing              Chills and/or fever over 101 F   Persistent vomiting and/or vomiting blood   Severe abdominal pain   Severe chest pain   Black, tarry stools   Bleeding- more than one tablespoon   Any other symptom or condition that you feel may need urgent attention  Your doctor recommends these additional instructions:  If any biopsies were taken, your doctors clinic will contact you in 1 to 2   weeks with any results.  - Patient has a contact number available for emergencies.  The signs and   symptoms of potential delayed complications were discussed with the   patient.  Return to normal activities tomorrow.  Written discharge   instructions were provided to the patient.   - Discharge patient to home.   - Resume previous diet.   - Continue present medications.   - Await pathology results.   For questions, problems or results please call your physician - Chai Kovacs MD at Work:  [phone].  [facility], EMERGENCY ROOM PHONE NUMBER: [phone]  IF A COMPLICATION OR EMERGENCY SITUATION ARISES AND YOU ARE UNABLE TO REACH   YOUR PHYSICIAN - GO DIRECTLY TO THE EMERGENCY ROOM.  Chai Kovacs MD  1/23/2024 1:45:45 PM  This report has been verified and signed electronically.  Dear patient,  As a result of recent federal legislation (The Federal Cures Act), you may   receive lab or pathology results from your procedure in your [facility]   account before your physician is able to contact you. Your physician or   their representative will relay the results to you with their   recommendations at their soonest availability.  Thank you,  PROVATION

## 2024-01-29 LAB
FINAL PATHOLOGIC DIAGNOSIS: NORMAL
GROSS: NORMAL
Lab: NORMAL

## 2024-01-30 ENCOUNTER — TELEPHONE (OUTPATIENT)
Dept: GASTROENTEROLOGY | Facility: CLINIC | Age: 54
End: 2024-01-30
Payer: COMMERCIAL

## 2024-01-30 ENCOUNTER — PATIENT MESSAGE (OUTPATIENT)
Dept: GASTROENTEROLOGY | Facility: CLINIC | Age: 54
End: 2024-01-30
Payer: COMMERCIAL

## 2024-01-30 DIAGNOSIS — K21.9 GASTROESOPHAGEAL REFLUX DISEASE, UNSPECIFIED WHETHER ESOPHAGITIS PRESENT: ICD-10-CM

## 2024-01-30 NOTE — TELEPHONE ENCOUNTER
----- Message from Chai Kovacs MD sent at 1/29/2024  1:18 PM CST -----  Nothing concerning on biopsies.

## 2024-01-31 RX ORDER — CALC/MAG/B COMPLEX/D3/HERB 61
15 TABLET ORAL DAILY
Qty: 30 CAPSULE | Refills: 11 | Status: SHIPPED | OUTPATIENT
Start: 2024-01-31 | End: 2024-05-13 | Stop reason: SDUPTHER

## 2024-01-31 NOTE — TELEPHONE ENCOUNTER
Prevacid 15mg once daily for 8 weeks, Pepcid as needed for breakthrough symptoms. F/up in 2 months. Dietician visit referral placed.

## 2024-02-02 ENCOUNTER — TELEPHONE (OUTPATIENT)
Dept: AUDIOLOGY | Facility: CLINIC | Age: 54
End: 2024-02-02
Payer: COMMERCIAL

## 2024-02-02 ENCOUNTER — TELEPHONE (OUTPATIENT)
Dept: GASTROENTEROLOGY | Facility: CLINIC | Age: 54
End: 2024-02-02
Payer: COMMERCIAL

## 2024-02-02 DIAGNOSIS — R42 VERTIGO: Primary | ICD-10-CM

## 2024-02-02 NOTE — TELEPHONE ENCOUNTER
----- Message from Isamar Hidalgo NP sent at 1/31/2024  7:16 AM CST -----  Regarding: dietician  Can someone help arrange for a GI Dietician visit for this patient? I would also to like her to have a 2 month followup with me. Thank you!

## 2024-02-08 ENCOUNTER — CLINICAL SUPPORT (OUTPATIENT)
Dept: AUDIOLOGY | Facility: CLINIC | Age: 54
End: 2024-02-08
Payer: COMMERCIAL

## 2024-02-08 DIAGNOSIS — R42 VERTIGO: ICD-10-CM

## 2024-02-08 DIAGNOSIS — H81.8X1 RIGHT-SIDED VESTIBULAR WEAKNESS: Primary | ICD-10-CM

## 2024-02-08 PROCEDURE — 92537 CALORIC VSTBLR TEST W/REC: CPT | Mod: S$GLB,,, | Performed by: AUDIOLOGIST

## 2024-02-08 PROCEDURE — 92540 BASIC VESTIBULAR EVALUATION: CPT | Mod: S$GLB,,, | Performed by: AUDIOLOGIST

## 2024-02-08 NOTE — PROGRESS NOTES
VNG EVALUATION    Referring physician:  Dr. Paulino Prescott Humberto, a 53 y.o. female complains of dizziness and imbalance. Ms. Paul reported her symptoms started approximately May of 2023. 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 Symptoms are provoked by quick head turns, spinning, dancing, and certain lighting. Ms. Paul noted a history of motion sickness. She denied taking any medications that may affect the results of today's evaluation.      Gaze nystagmus was absent.  Oculomotor function was normal.  Spontaneous nystagmus was absent    The head-hanging left Hallpike revealed <clinically insignificant> nystagmus: 3 d/s left-beating and non-torsional  The head-hanging right Hallpike revealed <clinically insignificant nystagmus> nystagmus: 2 d/s right-beating and non-torsional  Static positional testing revealed <clinically insignificant> nystagmus: 2 d/s right-beating in head right position and 2 d/s left-beating in head left position.    Unilateral weakness: 27% (right)  Directional preponderance 8% (left- beating)  RW: 28 d/s  LW: 46 d/s  RC: 10 d/s   d/s  Fixation suppression was abnormal: 51% left warm irrigation.    Impression: Abnormal VNG; The right weakness on caloric testing is suggestive of a right peripheral vestibular abnormality.    Recommend:   A trial with Cawthorne exercises; a copy was given to and reviewed with the patient  A formal Risk of Falls assessment and formal vestibular/balance rehab  Follow-up with Dr. Bob to review the results of today's evaluation

## 2024-05-13 ENCOUNTER — OFFICE VISIT (OUTPATIENT)
Dept: GASTROENTEROLOGY | Facility: CLINIC | Age: 54
End: 2024-05-13
Payer: COMMERCIAL

## 2024-05-13 ENCOUNTER — TELEPHONE (OUTPATIENT)
Dept: ENDOSCOPY | Facility: HOSPITAL | Age: 54
End: 2024-05-13
Payer: COMMERCIAL

## 2024-05-13 VITALS
WEIGHT: 130.06 LBS | SYSTOLIC BLOOD PRESSURE: 110 MMHG | BODY MASS INDEX: 21.67 KG/M2 | HEIGHT: 65 IN | HEART RATE: 79 BPM | DIASTOLIC BLOOD PRESSURE: 69 MMHG

## 2024-05-13 DIAGNOSIS — F10.24 ALCOHOL DEPENDENCE WITH ALCOHOL-INDUCED MOOD DISORDER: ICD-10-CM

## 2024-05-13 DIAGNOSIS — K21.9 GASTROESOPHAGEAL REFLUX DISEASE, UNSPECIFIED WHETHER ESOPHAGITIS PRESENT: Primary | ICD-10-CM

## 2024-05-13 DIAGNOSIS — Z12.11 COLON CANCER SCREENING: ICD-10-CM

## 2024-05-13 PROCEDURE — 99214 OFFICE O/P EST MOD 30 MIN: CPT | Mod: S$GLB,,,

## 2024-05-13 PROCEDURE — 1159F MED LIST DOCD IN RCRD: CPT | Mod: CPTII,S$GLB,,

## 2024-05-13 PROCEDURE — 3008F BODY MASS INDEX DOCD: CPT | Mod: CPTII,S$GLB,,

## 2024-05-13 PROCEDURE — 3074F SYST BP LT 130 MM HG: CPT | Mod: CPTII,S$GLB,,

## 2024-05-13 PROCEDURE — 3078F DIAST BP <80 MM HG: CPT | Mod: CPTII,S$GLB,,

## 2024-05-13 PROCEDURE — 99999 PR PBB SHADOW E&M-EST. PATIENT-LVL III: CPT | Mod: PBBFAC,,,

## 2024-05-13 RX ORDER — CALC/MAG/B COMPLEX/D3/HERB 61
15 TABLET ORAL DAILY
Qty: 30 CAPSULE | Refills: 11 | Status: SHIPPED | OUTPATIENT
Start: 2024-05-13 | End: 2025-05-13

## 2024-05-13 RX ORDER — FAMOTIDINE 20 MG/1
20 TABLET, FILM COATED ORAL 2 TIMES DAILY
Qty: 60 TABLET | Refills: 11 | Status: SHIPPED | OUTPATIENT
Start: 2024-05-13 | End: 2025-05-13

## 2024-05-13 NOTE — PROGRESS NOTES
Gastroenterology Clinic Consultation Note    Reason for Visit:  The primary encounter diagnosis was Gastroesophageal reflux disease, unspecified whether esophagitis present. Diagnoses of Alcohol dependence with alcohol-induced mood disorder and Colon cancer screening were also pertinent to this visit.    PCP:   Marva Lama.         Initial HPI   This is a 53 y.o. female presenting for GI Follow-up for her GERD. EGD performed which was normal. Had symptom improvement while on Prevacid 15mg once daily. Continued this for 8 weeks after EGD. Had really good symptom relief while on the medication. Ran out so she stopped and reports that her reflux symptoms did return about 2-3 weeks after. Has not taken in the last couple of days and reports that she will have AM nausea usually when waking. Denies nausea, vomiting, abdominal pain. She has also stopped drinking wine completely since the beginning of the year and believes this was causing some of her symptoms as well.     -LOV on 10/17/2023 w/myself for worsening GERD symptoms. EGD ordered. Was due for colon cancer screening, however, she did not prep so colonoscopy not completed.    ROS:  Review of Systems   Constitutional:  Negative for chills, fever, malaise/fatigue and weight loss.   HENT:  Negative for sore throat.    Eyes:  Negative for redness.   Gastrointestinal:  Positive for heartburn. Negative for abdominal pain, blood in stool, constipation, diarrhea, melena, nausea and vomiting.   Skin:  Negative for itching and rash.   Neurological:  Negative for dizziness and weakness.        Medical History:  has a past medical history of GERD (gastroesophageal reflux disease) and Hearing loss.    Surgical History:  has a past surgical history that includes Eye surgery (not sure) and Esophagogastroduodenoscopy (N/A, 1/23/2024).    Family History: family history includes Alcohol abuse in her brother;  "Autoimmune disease in her mother; Breast cancer in an other family member; Cancer in her brother, maternal aunt, maternal grandfather, maternal uncle, and mother; Early death in her brother; Hearing loss in her father; Stroke in her maternal grandmother..       Review of patient's allergies indicates:  No Known Allergies    Current Outpatient Medications on File Prior to Visit   Medication Sig Dispense Refill    naltrexone (DEPADE) 50 mg tablet Take 50 mg by mouth.      [DISCONTINUED] lansoprazole (PREVACID) 15 MG capsule Take 1 capsule (15 mg total) by mouth once daily. 30 capsule 11    GAVILYTE-C 240-22.72-6.72 -5.84 gram SolR Take 4,000 mLs by mouth once.      varicella-zoster gE-AS01B, PF, (SHINGRIX, PF,) 50 mcg/0.5 mL injection Inject 0.5mL IM at 0 and 2-6 months 1 each 1     No current facility-administered medications on file prior to visit.         Objective Findings:    Vital Signs:  /69   Pulse 79   Ht 5' 5" (1.651 m)   Wt 59 kg (130 lb 1.1 oz)   BMI 21.64 kg/m²   Body mass index is 21.64 kg/m².    Physical Exam:  Physical Exam  Vitals reviewed.   Constitutional:       Appearance: She is normal weight. She is not ill-appearing.   HENT:      Mouth/Throat:      Mouth: Mucous membranes are moist.      Pharynx: Oropharynx is clear.   Eyes:      General: No scleral icterus.  Abdominal:      General: Bowel sounds are normal. There is no distension.      Palpations: Abdomen is soft. There is no mass.   Skin:     General: Skin is warm and dry.      Capillary Refill: Capillary refill takes less than 2 seconds.      Coloration: Skin is not jaundiced or pale.   Neurological:      Mental Status: She is alert and oriented to person, place, and time. Mental status is at baseline.             Labs:  Lab Results   Component Value Date    WBC 8.03 10/17/2023    HGB 14.2 10/17/2023    HCT 43.7 10/17/2023     10/17/2023    CRP 1.4 05/11/2023    CHOL 214 (H) 07/09/2021    TRIG 101 07/09/2021    HDL 89 (H) " 07/09/2021    ALKPHOS 46 (L) 05/11/2023    LIPASE 17 10/03/2022    ALT 12 05/11/2023    AST 18 05/11/2023     05/11/2023    K 4.2 05/11/2023     05/11/2023    CREATININE 0.6 05/11/2023    BUN 9 05/11/2023    CO2 23 05/11/2023    TSH 2.265 07/09/2021    HGBA1C 4.6 07/09/2021       Imaging reviewed: No pertinent imaging reviewed      Endoscopy reviewed: EGD 1/23/2024  Impression:            - Small hiatal hernia.                          - Biopsies were taken for Helicobacter pylori and                          Celiac disease testing.   Recommendation:        - Patient has a contact number available for                          emergencies. The signs and symptoms of potential                          delayed complications were discussed with the                          patient. Return to normal activities tomorrow.                          Written discharge instructions were provided to                          the patient.                          - Discharge patient to home.                          - Resume previous diet.                          - Continue present medications.                          - Await pathology results.   Chai Kovacs MD   1/23/2024 1:45:45 PM     Assessment:  1. Gastroesophageal reflux disease, unspecified whether esophagitis present    2. Alcohol dependence with alcohol-induced mood disorder    3. Colon cancer screening      Orders Placed This Encounter    lansoprazole (PREVACID) 15 MG capsule    famotidine (PEPCID) 20 MG tablet         Plan:  Orders Placed This Encounter    lansoprazole (PREVACID) 15 MG capsule    famotidine (PEPCID) 20 MG tablet     Good symptom relief with Prevacid. OK to continue low dose medication. Can take every other day if she begins having complete resolution of symptoms given her abstinence from alcohol which was likely triggering her worsening symptoms. Pepcid as needed for breakthrough.  Currently refraining from wine since the beginning of  the year.   Referral placed for colonoscopy.         Thank you for allowing me to participate in this patient's care.    Sincerely,     Isamar Hidalgo NP  Gastroenterology Department  [facility]

## 2024-05-13 NOTE — TELEPHONE ENCOUNTER
"----- Message from Cee Perez MA sent at 2024  2:16 PM CDT -----  Regarding: FW: colonoscopy    ----- Message -----  From: Isamar Hidalgo NP  Sent: 2024   2:04 PM CDT  To: [facility]  Subject: colonoscopy                                      Procedure: Colonoscopy    Diagnosis: Screening colonoscopy    Procedure Timin-12 weeks    #If within 4 weeks selected, please ranjit as high priority#    #If greater than 12 weeks, please select "5-12 weeks" and delay sending until 3 months prior to requested date#     [facility]: [facility]    Additional Scheduling Information: No scheduling concerns    Prep Specifications:Standard prep    Is the patient taking a GLP-1 Agonist:no    Have you attached a patient to this message: yes  "

## 2024-05-13 NOTE — TELEPHONE ENCOUNTER
Pt wants to schedule for August. Will follow up once schedule is open. Pt also will call to schedule

## 2024-05-17 ENCOUNTER — PATIENT MESSAGE (OUTPATIENT)
Dept: GASTROENTEROLOGY | Facility: CLINIC | Age: 54
End: 2024-05-17
Payer: COMMERCIAL

## 2024-06-23 ENCOUNTER — OFFICE VISIT (OUTPATIENT)
Dept: URGENT CARE | Facility: CLINIC | Age: 54
End: 2024-06-23
Payer: COMMERCIAL

## 2024-06-23 ENCOUNTER — NURSE TRIAGE (OUTPATIENT)
Dept: ADMINISTRATIVE | Facility: CLINIC | Age: 54
End: 2024-06-23
Payer: COMMERCIAL

## 2024-06-23 VITALS
WEIGHT: 130 LBS | RESPIRATION RATE: 15 BRPM | HEIGHT: 65 IN | OXYGEN SATURATION: 98 % | SYSTOLIC BLOOD PRESSURE: 152 MMHG | TEMPERATURE: 99 F | BODY MASS INDEX: 21.66 KG/M2 | HEART RATE: 94 BPM | DIASTOLIC BLOOD PRESSURE: 88 MMHG

## 2024-06-23 DIAGNOSIS — R42 LIGHTHEADEDNESS: ICD-10-CM

## 2024-06-23 DIAGNOSIS — R20.2 PARESTHESIA: ICD-10-CM

## 2024-06-23 DIAGNOSIS — I44.4 LEFT ANTERIOR FASCICULAR BLOCK (LAFB): ICD-10-CM

## 2024-06-23 DIAGNOSIS — R07.9 CHEST PAIN, UNSPECIFIED TYPE: Primary | ICD-10-CM

## 2024-06-23 PROBLEM — F10.90 ALCOHOL USE DISORDER: Status: ACTIVE | Noted: 2023-11-20

## 2024-06-23 PROBLEM — F43.22 ADJUSTMENT DISORDER WITH ANXIOUS MOOD: Status: ACTIVE | Noted: 2023-11-20

## 2024-06-23 PROBLEM — F41.9 ANXIETY: Status: ACTIVE | Noted: 2023-11-20

## 2024-06-23 PROCEDURE — 93010 ELECTROCARDIOGRAM REPORT: CPT | Mod: S$GLB,,, | Performed by: INTERNAL MEDICINE

## 2024-06-23 PROCEDURE — 93005 ELECTROCARDIOGRAM TRACING: CPT | Mod: S$GLB,,, | Performed by: FAMILY MEDICINE

## 2024-06-23 PROCEDURE — 99214 OFFICE O/P EST MOD 30 MIN: CPT | Mod: S$GLB,,, | Performed by: FAMILY MEDICINE

## 2024-06-23 NOTE — PROGRESS NOTES
"Subjective:      Patient ID: Genie Paul is a 53 y.o. female.    Vitals:  height is 5' 5" (1.651 m) and weight is 59 kg (130 lb). Her tympanic temperature is 98.8 °F (37.1 °C). Her blood pressure is 152/88 (abnormal) and her pulse is 94. Her respiration is 15 and oxygen saturation is 98%.     Chief Complaint: Chest Pain    Patient presents with c.o chest pain for the last three hours. Patient states she was at a book club meeting outdoors in the heat and started to feel weak and dizzy. She then called the nurse on call line and was instructed to call 911/go to the er. Patient came to urgent care instead. Patient with weakness, dizziness, and left and right arm tingling(worse on the left) . Hx of gerd which is managed with pepcid. Patient took medication prior to coming but it did not alleviate the pain. No fever.   Hx of palpitations and some chest pain for which she saw cardiology last year. She had CTA study done that was normal. The cardiologist did order a holter monitor which she didn't have done. She recalls the cardiologist did not feel her problems was from her heart. She flet fine yesterday and was out in heat and exercised a lot.          Chest Pain   This is a new problem. Episode onset: 3 hours pta. The onset quality is sudden. The problem occurs intermittently. The problem has been unchanged. The pain does not radiate. Associated symptoms include diaphoresis, dizziness and weakness. Pertinent negatives include no fever, nausea, palpitations or vomiting. The pain is aggravated by nothing. Treatments tried: pepcid.     Constitution: Positive for sweating. Negative for fever.   Cardiovascular:  Positive for chest pain. Negative for palpitations.   Gastrointestinal:  Negative for nausea and vomiting.   Neurological:  Positive for dizziness.      Objective:     Physical Exam   Constitutional: She is oriented to person, place, and time.   Abdominal: Normal appearance.   Neurological: no focal deficit. " She is alert and oriented to person, place, and time.   Skin: Skin is warm and dry.   Psychiatric: Her behavior is normal. Judgment and thought content normal.   Nursing note and vitals reviewed.      Assessment:     1. Chest pain, unspecified type    2. Left anterior fascicular block (LAFB)    3. Lightheadedness    4. Paresthesia        Plan:   EKG from 2022 also showed LAFB and pt seen by cardiology     Chest pain, unspecified type  -     IN OFFICE EKG 12-LEAD (to Muse)    Left anterior fascicular block (LAFB)    Lightheadedness  -suspect related to mild heat illness and dehydration. Recommend rest and fluids, avoid sun and heat    Paresthesia    Pt or guardian provided educational materials and instructions regarding their visit diagnosis.    I do recommend she follow with cardiology and have the holter monitor done.  If symptoms worsen in the meantime then proceed to the er

## 2024-06-23 NOTE — TELEPHONE ENCOUNTER
"Genie c/o "feeling weird, clammy hands, heart palpitations but Pulse is WNL (70's), dizziness/lightheadedness, chest pain, tingling in hands & mild SOB. Symptoms started around 1300 today while out in the heat. Advised pt per triage protocol to call  now. V/u.   Reason for Disposition   [1] Chest pain lasts > 5 minutes AND [2] age > 44    Additional Information   Negative: SEVERE difficulty breathing (e.g., struggling for each breath, speaks in single words)   Negative: Difficult to awaken or acting confused (e.g., disoriented, slurred speech)   Negative: Shock suspected (e.g., cold/pale/clammy skin, too weak to stand, low BP, rapid pulse)   Negative: Passed out (e.g., fainted, lost consciousness, blacked out and was not responding)    Protocols used: Chest Pain-A-AH    "

## 2024-06-24 LAB
OHS QRS DURATION: 94 MS
OHS QTC CALCULATION: 433 MS

## 2024-06-24 NOTE — TELEPHONE ENCOUNTER
Spoke with patient who informed me that she did go to an urgent care and they informed her that it may have been a heat stroke and dehydration. Set patient up for a virtual visit on Monday to speak with the provider.

## 2024-06-27 PROBLEM — R06.02 SOB (SHORTNESS OF BREATH): Status: RESOLVED | Noted: 2023-11-07 | Resolved: 2024-06-27

## 2024-06-27 PROBLEM — R07.89 OTHER CHEST PAIN: Status: ACTIVE | Noted: 2024-06-27

## 2024-06-27 PROBLEM — M33.90: Status: RESOLVED | Noted: 2023-01-06 | Resolved: 2024-06-27

## 2024-08-02 ENCOUNTER — OFFICE VISIT (OUTPATIENT)
Dept: FAMILY MEDICINE | Facility: CLINIC | Age: 54
End: 2024-08-02
Attending: FAMILY MEDICINE
Payer: COMMERCIAL

## 2024-08-02 ENCOUNTER — LAB VISIT (OUTPATIENT)
Dept: LAB | Facility: HOSPITAL | Age: 54
End: 2024-08-02
Attending: FAMILY MEDICINE
Payer: COMMERCIAL

## 2024-08-02 VITALS
SYSTOLIC BLOOD PRESSURE: 117 MMHG | HEART RATE: 75 BPM | WEIGHT: 130 LBS | HEIGHT: 65 IN | BODY MASS INDEX: 21.66 KG/M2 | DIASTOLIC BLOOD PRESSURE: 67 MMHG

## 2024-08-02 DIAGNOSIS — R30.0 DYSURIA: Primary | ICD-10-CM

## 2024-08-02 DIAGNOSIS — R30.0 DYSURIA: ICD-10-CM

## 2024-08-02 PROCEDURE — 87086 URINE CULTURE/COLONY COUNT: CPT | Performed by: FAMILY MEDICINE

## 2024-08-02 RX ORDER — CIPROFLOXACIN 500 MG/1
500 TABLET ORAL EVERY 12 HOURS
Qty: 20 TABLET | Refills: 0 | Status: SHIPPED | OUTPATIENT
Start: 2024-08-02

## 2024-08-02 NOTE — PROGRESS NOTES
The patient location is: home  The chief complaint leading to consultation is: dysuria    Visit type: audiovisual  Face to Face time with patient: 15  20 minutes of total time spent on the encounter, which includes face to face time and non-face to face time preparing to see the patient (eg, review of tests), Obtaining and/or reviewing separately obtained history, Documenting clinical information in the electronic or other health record, Independently interpreting results (not separately reported) and communicating results to the patient/family/caregiver, or Care coordination (not separately reported).         Each patient to whom he or she provides medical services by telemedicine is:  (1) informed of the relationship between the physician and patient and the respective role of any other health care provider with respect to management of the patient; and (2) notified that he or she may decline to receive medical services by telemedicine and may withdraw from such care at any time.    Notes:   Subjective:       Patient ID: Genie Paul is a 54 y.o. female.    Chief Complaint: Urinary Tract Infection    Urinary Tract Infection   Associated symptoms include frequency and nausea. Pertinent negatives include no chills, hematuria, vomiting, weight loss or constipation. There is no history of kidney stones.   Abdominal Pain  This is a new problem. The current episode started in the past 7 days. The onset quality is gradual. The problem occurs constantly. The most recent episode lasted 1 days. The problem has been gradually worsening. The pain is located in the left flank. The pain is at a severity of 2/10. The pain is mild. The quality of the pain is dull. Associated symptoms include dysuria, flatus, frequency and nausea. Pertinent negatives include no anorexia, arthralgias, belching, constipation, diarrhea, fever, headaches, hematochezia, hematuria, melena, myalgias, vomiting or weight loss. The pain is aggravated  "by certain positions. The pain is relieved by Standing. She has tried nothing for the symptoms. The treatment provided no relief. Her past medical history is significant for GERD. There is no history of abdominal surgery, colon cancer, Crohn's disease, gallstones, irritable bowel syndrome, pancreatitis, PUD or ulcerative colitis. Patient's medical history includes UTI. Patient's medical history does not include kidney stones.     Pt is in virtual visit for c/o pelvic pressure and dysuria with frequency several days she is increasing her fluid intake  No hematuria flank pain, no  n/v/f/c/d/c no change in bowel habits   Review of Systems   Constitutional:  Negative for chills, fatigue, fever and weight loss.   Respiratory:  Negative for cough, chest tightness and shortness of breath.    Cardiovascular:  Negative for chest pain and palpitations.   Gastrointestinal:  Positive for abdominal pain, flatus and nausea. Negative for anorexia, constipation, diarrhea, hematochezia, melena and vomiting.   Genitourinary:  Positive for dysuria and frequency. Negative for hematuria.   Musculoskeletal:  Negative for arthralgias and myalgias.   Neurological:  Negative for headaches.       Objective:    /67   Pulse 75   Ht 5' 5" (1.651 m)   Wt 59 kg (130 lb)   BMI 21.63 kg/m²     Physical Exam  Constitutional:       Appearance: Normal appearance. She is not ill-appearing.   HENT:      Nose: Nose normal.   Eyes:      Extraocular Movements: Extraocular movements intact.   Pulmonary:      Effort: Pulmonary effort is normal. No respiratory distress.   Neurological:      General: No focal deficit present.      Mental Status: She is alert and oriented to person, place, and time.      Cranial Nerves: No cranial nerve deficit.      Coordination: Coordination normal.   Psychiatric:         Mood and Affect: Mood normal.         Behavior: Behavior normal.         Thought Content: Thought content normal.         Judgment: Judgment " "normal.         Assessment:       1. Dysuria        Plan:     Orders urine culture  Cont meds  Increase water intake  Cranberry juice    Rtc for renetta in 2 weeks     "This note will not be shared with the patient."   "

## 2024-08-04 LAB
BACTERIA UR CULT: NORMAL
BACTERIA UR CULT: NORMAL

## 2024-08-13 ENCOUNTER — OFFICE VISIT (OUTPATIENT)
Dept: CARDIOLOGY | Facility: CLINIC | Age: 54
End: 2024-08-13
Payer: COMMERCIAL

## 2024-08-13 VITALS
HEART RATE: 71 BPM | BODY MASS INDEX: 22.26 KG/M2 | DIASTOLIC BLOOD PRESSURE: 79 MMHG | SYSTOLIC BLOOD PRESSURE: 118 MMHG | WEIGHT: 133.63 LBS | HEIGHT: 65 IN

## 2024-08-13 DIAGNOSIS — R07.89 OTHER CHEST PAIN: Primary | ICD-10-CM

## 2024-08-13 PROCEDURE — 1159F MED LIST DOCD IN RCRD: CPT | Mod: CPTII,S$GLB,, | Performed by: INTERNAL MEDICINE

## 2024-08-13 PROCEDURE — 99999 PR PBB SHADOW E&M-EST. PATIENT-LVL III: CPT | Mod: PBBFAC,,, | Performed by: INTERNAL MEDICINE

## 2024-08-13 PROCEDURE — 3078F DIAST BP <80 MM HG: CPT | Mod: CPTII,S$GLB,, | Performed by: INTERNAL MEDICINE

## 2024-08-13 PROCEDURE — 1160F RVW MEDS BY RX/DR IN RCRD: CPT | Mod: CPTII,S$GLB,, | Performed by: INTERNAL MEDICINE

## 2024-08-13 PROCEDURE — 3074F SYST BP LT 130 MM HG: CPT | Mod: CPTII,S$GLB,, | Performed by: INTERNAL MEDICINE

## 2024-08-13 PROCEDURE — 3008F BODY MASS INDEX DOCD: CPT | Mod: CPTII,S$GLB,, | Performed by: INTERNAL MEDICINE

## 2024-08-13 PROCEDURE — 99214 OFFICE O/P EST MOD 30 MIN: CPT | Mod: S$GLB,,, | Performed by: INTERNAL MEDICINE

## 2024-08-13 RX ORDER — LANSOPRAZOLE 15 MG/1
15 TABLET, ORALLY DISINTEGRATING, DELAYED RELEASE ORAL DAILY
COMMUNITY

## 2024-08-13 NOTE — PROGRESS NOTES
HISTORY:    54-year-old female with a h/o prev etoh abuse, anxiety presenting for 2nd visit with me.    Initially evaluated in 2023 for a variety of symptoms.  Underwent coronary calcium score that was 0.  We have ordered a Holter for palpitations, but that was not completed.    Today, she continues to report intermittent chest pain. Every few days, lasting seconds at a time. On days when she has it, it will occur multiple times throughout the day. Non-limiting. No clear triggers, maybe stress. Stable for years.     Palpitations noted at last visit, resolved.     Activity levels excellent. Walks 4.5 miles/day without issue. 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    The patient denies any previous history of myocardial infarction, coronary artery disease, peripheral arterial disease, stroke, congestive heart failure, or cardiomyopathy.      PHYSICAL EXAM:    Vitals:    08/13/24 1405   BP: 118/79   Pulse: 71       NAD, A+Ox3.  No jvd, no bruit.  RRR nml s1,s2. No murmurs.  CTA B no wheezes or crackles.  No edema.    LABS/STUDIES (imaging reviewed during clinic visit):    October 2023 CBC normal.  May 2023 creatinine 0.6 with a BUN of 9.  2021  and HDL 89.  Triglycerides 101.  A1c and TSH normal.    ECG June 2024 and 2023 sinus rhythm with no Q-waves or ST deviation.  PRWP vs LAFB.   Coronary calcium score 2023 Agatston score of 0.      ASSESSMENT & PLAN:    1. Other chest pain                Chronic non-cardiac discomfort with a calcium score of 0.     ECG stable and no new symptoms. Expectant management reasonable.    Bps controlled. LDL reasonable. Low 10 year ASCVD risk. Good activity levels.    Follow up if symptoms worsen or fail to improve.      Thang Barton MD

## 2024-08-23 ENCOUNTER — TELEPHONE (OUTPATIENT)
Dept: OPTOMETRY | Facility: CLINIC | Age: 54
End: 2024-08-23
Payer: COMMERCIAL

## 2024-08-23 ENCOUNTER — OFFICE VISIT (OUTPATIENT)
Dept: OPTOMETRY | Facility: CLINIC | Age: 54
End: 2024-08-23
Payer: COMMERCIAL

## 2024-08-23 DIAGNOSIS — H04.123 DRY EYE SYNDROME OF BOTH EYES: Primary | ICD-10-CM

## 2024-08-23 PROCEDURE — 99999 PR PBB SHADOW E&M-EST. PATIENT-LVL II: CPT | Mod: PBBFAC,,, | Performed by: OPTOMETRIST

## 2024-08-23 NOTE — PROGRESS NOTES
HPI    DANIELA: 12/09/2020 Dr. Robertson   Last DFE: 2022  Chief complaint (CC): 55 yo F here for Urgent visit. Pt c/o irritation,   discomfort and redness OS.     Glasses? Yes. Trifocal  Contacts? No  H/o eye surgery, injections or laser: No  H/o eye injury: About a month ago, pt poked herself in the left eye.   Known eye conditions? No  Family h/o eye conditions? No  Eye gtts? Systane preservative free daily use      (-) Flashes (-)  Floaters (-) Mucous   (+)  Tearing (+) Itching (+) Burning   (-) Headaches (+) Eye Pain/discomfort (+) Irritation   (+)  Redness (-) Double vision (-) Blurry vision    Diabetic? No  A1c? Lab Results       Component                Value               Date                       HGBA1C                   4.6                 07/09/2021              Last edited by Ramiro Gonzalez, OD on 8/23/2024  3:45 PM.            Assessment /Plan     For exam results, see Encounter Report.          Dry eye syndrome of both eyes   - Recommend artificial tears. 1 drop 4x per day or more as needed. Advised AT gel tears for longer lasting effect. AT yoly for nightly use.   - RTC should symptoms worsen or fail to improve.

## 2024-11-21 ENCOUNTER — LAB VISIT (OUTPATIENT)
Dept: LAB | Facility: HOSPITAL | Age: 54
End: 2024-11-21
Attending: FAMILY MEDICINE
Payer: COMMERCIAL

## 2024-11-21 ENCOUNTER — OFFICE VISIT (OUTPATIENT)
Dept: FAMILY MEDICINE | Facility: CLINIC | Age: 54
End: 2024-11-21
Attending: FAMILY MEDICINE
Payer: COMMERCIAL

## 2024-11-21 VITALS
DIASTOLIC BLOOD PRESSURE: 83 MMHG | WEIGHT: 125 LBS | BODY MASS INDEX: 20.8 KG/M2 | OXYGEN SATURATION: 100 % | HEART RATE: 96 BPM | SYSTOLIC BLOOD PRESSURE: 137 MMHG

## 2024-11-21 DIAGNOSIS — Z12.12 SCREENING FOR COLORECTAL CANCER: ICD-10-CM

## 2024-11-21 DIAGNOSIS — Z00.00 ANNUAL PHYSICAL EXAM: ICD-10-CM

## 2024-11-21 DIAGNOSIS — K21.9 GASTROESOPHAGEAL REFLUX DISEASE, UNSPECIFIED WHETHER ESOPHAGITIS PRESENT: ICD-10-CM

## 2024-11-21 DIAGNOSIS — Z00.00 ANNUAL PHYSICAL EXAM: Primary | ICD-10-CM

## 2024-11-21 DIAGNOSIS — Z12.11 SCREENING FOR COLORECTAL CANCER: ICD-10-CM

## 2024-11-21 PROBLEM — F32.1 CURRENT MODERATE EPISODE OF MAJOR DEPRESSIVE DISORDER WITHOUT PRIOR EPISODE: Status: RESOLVED | Noted: 2023-02-20 | Resolved: 2024-11-21

## 2024-11-21 LAB
ALBUMIN SERPL BCP-MCNC: 4.3 G/DL (ref 3.5–5.2)
ALP SERPL-CCNC: 45 U/L (ref 40–150)
ALT SERPL W/O P-5'-P-CCNC: 11 U/L (ref 10–44)
ANION GAP SERPL CALC-SCNC: 9 MMOL/L (ref 8–16)
AST SERPL-CCNC: 15 U/L (ref 10–40)
BASOPHILS # BLD AUTO: 0.06 K/UL (ref 0–0.2)
BASOPHILS NFR BLD: 0.9 % (ref 0–1.9)
BILIRUB SERPL-MCNC: 0.3 MG/DL (ref 0.1–1)
BILIRUB UR QL STRIP: NEGATIVE
BUN SERPL-MCNC: 8 MG/DL (ref 6–20)
CALCIUM SERPL-MCNC: 9.8 MG/DL (ref 8.7–10.5)
CHLORIDE SERPL-SCNC: 106 MMOL/L (ref 95–110)
CLARITY UR REFRACT.AUTO: CLEAR
CO2 SERPL-SCNC: 26 MMOL/L (ref 23–29)
COLOR UR AUTO: COLORLESS
CREAT SERPL-MCNC: 0.7 MG/DL (ref 0.5–1.4)
DIFFERENTIAL METHOD BLD: NORMAL
EOSINOPHIL # BLD AUTO: 0.2 K/UL (ref 0–0.5)
EOSINOPHIL NFR BLD: 2.9 % (ref 0–8)
ERYTHROCYTE [DISTWIDTH] IN BLOOD BY AUTOMATED COUNT: 12.8 % (ref 11.5–14.5)
EST. GFR  (NO RACE VARIABLE): >60 ML/MIN/1.73 M^2
ESTIMATED AVG GLUCOSE: 100 MG/DL (ref 68–131)
GLUCOSE SERPL-MCNC: 100 MG/DL (ref 70–110)
GLUCOSE UR QL STRIP: NEGATIVE
HBA1C MFR BLD: 5.1 % (ref 4–5.6)
HCT VFR BLD AUTO: 43 % (ref 37–48.5)
HGB BLD-MCNC: 13.9 G/DL (ref 12–16)
HGB UR QL STRIP: NEGATIVE
IMM GRANULOCYTES # BLD AUTO: 0.01 K/UL (ref 0–0.04)
IMM GRANULOCYTES NFR BLD AUTO: 0.2 % (ref 0–0.5)
KETONES UR QL STRIP: NEGATIVE
LEUKOCYTE ESTERASE UR QL STRIP: NEGATIVE
LYMPHOCYTES # BLD AUTO: 2.2 K/UL (ref 1–4.8)
LYMPHOCYTES NFR BLD: 33.6 % (ref 18–48)
MCH RBC QN AUTO: 29.8 PG (ref 27–31)
MCHC RBC AUTO-ENTMCNC: 32.3 G/DL (ref 32–36)
MCV RBC AUTO: 92 FL (ref 82–98)
MONOCYTES # BLD AUTO: 0.4 K/UL (ref 0.3–1)
MONOCYTES NFR BLD: 5.6 % (ref 4–15)
NEUTROPHILS # BLD AUTO: 3.7 K/UL (ref 1.8–7.7)
NEUTROPHILS NFR BLD: 56.8 % (ref 38–73)
NITRITE UR QL STRIP: NEGATIVE
NRBC BLD-RTO: 0 /100 WBC
PH UR STRIP: 8 [PH] (ref 5–8)
PLATELET # BLD AUTO: 238 K/UL (ref 150–450)
PMV BLD AUTO: 10.7 FL (ref 9.2–12.9)
POTASSIUM SERPL-SCNC: 4 MMOL/L (ref 3.5–5.1)
PROT SERPL-MCNC: 7.5 G/DL (ref 6–8.4)
PROT UR QL STRIP: NEGATIVE
RBC # BLD AUTO: 4.66 M/UL (ref 4–5.4)
SODIUM SERPL-SCNC: 141 MMOL/L (ref 136–145)
SP GR UR STRIP: 1 (ref 1–1.03)
TSH SERPL DL<=0.005 MIU/L-ACNC: 0.89 UIU/ML (ref 0.4–4)
URN SPEC COLLECT METH UR: ABNORMAL
WBC # BLD AUTO: 6.55 K/UL (ref 3.9–12.7)

## 2024-11-21 PROCEDURE — 81003 URINALYSIS AUTO W/O SCOPE: CPT | Performed by: FAMILY MEDICINE

## 2024-11-21 PROCEDURE — 80053 COMPREHEN METABOLIC PANEL: CPT | Performed by: FAMILY MEDICINE

## 2024-11-21 PROCEDURE — 82652 VIT D 1 25-DIHYDROXY: CPT | Performed by: FAMILY MEDICINE

## 2024-11-21 PROCEDURE — 99999 PR PBB SHADOW E&M-EST. PATIENT-LVL III: CPT | Mod: PBBFAC,,, | Performed by: FAMILY MEDICINE

## 2024-11-21 PROCEDURE — 85025 COMPLETE CBC W/AUTO DIFF WBC: CPT | Performed by: FAMILY MEDICINE

## 2024-11-21 PROCEDURE — 84443 ASSAY THYROID STIM HORMONE: CPT | Performed by: FAMILY MEDICINE

## 2024-11-21 PROCEDURE — 83036 HEMOGLOBIN GLYCOSYLATED A1C: CPT | Performed by: FAMILY MEDICINE

## 2024-11-21 RX ORDER — CALC/MAG/B COMPLEX/D3/HERB 61
15 TABLET ORAL
COMMUNITY
Start: 2024-10-17 | End: 2024-11-21

## 2024-11-21 NOTE — PROGRESS NOTES
Subjective:       Patient ID: Genie Paul is a 54 y.o. female.    Chief Complaint: Establish Care    HPI  Pt is here for annual exam pt is well no sob/cp cough chest congestion sore throat uri symptoms  Pt denies n/v/f/c/d/c no change in bowel habits no brbpr  Pt denies dysuria hematuria no abnl vaginal bleeding  Pt has gerd takes pepcid but noted break through if she mises a couple of doses    Review of Systems   Constitutional:  Negative for activity change, chills, diaphoresis, fatigue and fever.   HENT:  Negative for congestion, ear discharge, ear pain, hearing loss, postnasal drip, rhinorrhea, sinus pressure, sneezing, sore throat, trouble swallowing and voice change.    Eyes:  Negative for photophobia, discharge, redness, itching and visual disturbance.   Respiratory:  Negative for cough, chest tightness, shortness of breath and wheezing.    Cardiovascular:  Negative for chest pain, palpitations and leg swelling.   Gastrointestinal:  Negative for abdominal pain, anal bleeding, blood in stool, constipation, diarrhea, nausea, rectal pain and vomiting.   Genitourinary:  Negative for dyspareunia, dysuria, flank pain, frequency, hematuria, menstrual problem, pelvic pain, urgency, vaginal bleeding and vaginal discharge.   Musculoskeletal:  Negative for arthralgias, back pain, joint swelling and neck pain.   Skin:  Negative for color change and rash.   Neurological:  Negative for dizziness, speech difficulty, weakness, light-headedness, numbness and headaches.   Hematological:  Does not bruise/bleed easily.   Psychiatric/Behavioral:  Negative for agitation, confusion, decreased concentration, sleep disturbance and suicidal ideas. The patient is not nervous/anxious.        Objective:    /83   Pulse 96   Wt 56.7 kg (125 lb)   LMP 11/01/2024 (Approximate)   SpO2 100%   BMI 20.80 kg/m²     Physical Exam  Constitutional:       Appearance: Normal appearance. She is well-developed. She is not ill-appearing.    HENT:      Head: Normocephalic and atraumatic.      Right Ear: Tympanic membrane and external ear normal.      Left Ear: Tympanic membrane and external ear normal.      Nose: Nose normal.   Eyes:      General:         Right eye: No discharge.         Left eye: No discharge.      Conjunctiva/sclera: Conjunctivae normal.      Pupils: Pupils are equal, round, and reactive to light.   Neck:      Thyroid: No thyromegaly.   Cardiovascular:      Rate and Rhythm: Normal rate and regular rhythm.      Heart sounds: Normal heart sounds. No murmur heard.     No friction rub. No gallop.   Pulmonary:      Effort: Pulmonary effort is normal.      Breath sounds: Normal breath sounds. No wheezing or rales.   Abdominal:      General: Bowel sounds are normal. There is no distension.      Palpations: Abdomen is soft.      Tenderness: There is no abdominal tenderness. There is no guarding or rebound.   Genitourinary:     Vagina: Normal.      Comments: declined  Musculoskeletal:         General: No tenderness. Normal range of motion.      Cervical back: Normal range of motion and neck supple.   Lymphadenopathy:      Cervical: No cervical adenopathy.   Skin:     General: Skin is warm and dry.      Findings: No erythema or rash.   Neurological:      General: No focal deficit present.      Mental Status: She is alert and oriented to person, place, and time.      Cranial Nerves: No cranial nerve deficit.      Motor: No abnormal muscle tone.      Coordination: Coordination normal.   Psychiatric:         Behavior: Behavior normal.         Thought Content: Thought content normal.         Judgment: Judgment normal.         Assessment:       1. Annual physical exam    2. Gastroesophageal reflux disease, unspecified whether esophagitis present    3. Screening for colorectal cancer        Plan:     Orders cmp cbc  tsh urine hgb A1c  Cont meds  Low fat bland diet  Avoid late meals and triggers  Graded exercise  Increase water intake  Rtc 6  "months    Health maintenance  Discussed with pt        "This note will not be shared with the patient."     "

## 2024-11-25 LAB — 1,25(OH)2D3 SERPL-MCNC: 60 PG/ML (ref 20–79)

## 2025-01-29 DIAGNOSIS — Z12.31 OTHER SCREENING MAMMOGRAM: ICD-10-CM

## 2025-03-05 ENCOUNTER — HOSPITAL ENCOUNTER (OUTPATIENT)
Dept: RADIOLOGY | Facility: OTHER | Age: 55
Discharge: HOME | End: 2025-03-05
Attending: FAMILY MEDICINE
Payer: COMMERCIAL

## 2025-03-05 DIAGNOSIS — Z12.31 OTHER SCREENING MAMMOGRAM: ICD-10-CM

## 2025-03-05 PROCEDURE — 77067 SCR MAMMO BI INCL CAD: CPT | Mod: TC

## 2025-03-05 PROCEDURE — 77063 BREAST TOMOSYNTHESIS BI: CPT | Mod: 26,,, | Performed by: RADIOLOGY

## 2025-03-05 PROCEDURE — 77067 SCR MAMMO BI INCL CAD: CPT | Mod: 26,,, | Performed by: RADIOLOGY

## 2025-03-14 ENCOUNTER — OFFICE VISIT (OUTPATIENT)
Dept: FAMILY MEDICINE | Facility: CLINIC | Age: 55
End: 2025-03-14
Payer: COMMERCIAL

## 2025-03-14 VITALS
BODY MASS INDEX: 22.82 KG/M2 | HEIGHT: 65 IN | OXYGEN SATURATION: 100 % | SYSTOLIC BLOOD PRESSURE: 120 MMHG | WEIGHT: 137 LBS | DIASTOLIC BLOOD PRESSURE: 80 MMHG | HEART RATE: 73 BPM

## 2025-03-14 DIAGNOSIS — R10.2 PELVIC PAIN: Primary | ICD-10-CM

## 2025-03-14 DIAGNOSIS — N95.1 PERIMENOPAUSE: ICD-10-CM

## 2025-03-14 LAB
BACTERIA #/AREA URNS AUTO: NORMAL /HPF
BACTERIA #/AREA URNS AUTO: NORMAL /HPF
BILIRUB UR QL STRIP: NEGATIVE
BILIRUB UR QL STRIP: NEGATIVE
CLARITY UR REFRACT.AUTO: CLEAR
CLARITY UR REFRACT.AUTO: CLEAR
COLOR UR AUTO: COLORLESS
COLOR UR AUTO: COLORLESS
GLUCOSE UR QL STRIP: NEGATIVE
GLUCOSE UR QL STRIP: NEGATIVE
HGB UR QL STRIP: NEGATIVE
HGB UR QL STRIP: NEGATIVE
KETONES UR QL STRIP: NEGATIVE
KETONES UR QL STRIP: NEGATIVE
LEUKOCYTE ESTERASE UR QL STRIP: ABNORMAL
LEUKOCYTE ESTERASE UR QL STRIP: ABNORMAL
MICROSCOPIC COMMENT: NORMAL
MICROSCOPIC COMMENT: NORMAL
NITRITE UR QL STRIP: NEGATIVE
NITRITE UR QL STRIP: NEGATIVE
PH UR STRIP: 8 [PH] (ref 5–8)
PH UR STRIP: 8 [PH] (ref 5–8)
PROT UR QL STRIP: NEGATIVE
PROT UR QL STRIP: NEGATIVE
RBC #/AREA URNS AUTO: 0 /HPF (ref 0–4)
RBC #/AREA URNS AUTO: 0 /HPF (ref 0–4)
SP GR UR STRIP: 1 (ref 1–1.03)
SP GR UR STRIP: 1 (ref 1–1.03)
SQUAMOUS #/AREA URNS AUTO: 0 /HPF
SQUAMOUS #/AREA URNS AUTO: 1 /HPF
URN SPEC COLLECT METH UR: ABNORMAL
URN SPEC COLLECT METH UR: ABNORMAL
WBC #/AREA URNS AUTO: 3 /HPF (ref 0–5)
WBC #/AREA URNS AUTO: 4 /HPF (ref 0–5)

## 2025-03-14 PROCEDURE — 81001 URINALYSIS AUTO W/SCOPE: CPT | Performed by: NURSE PRACTITIONER

## 2025-03-14 PROCEDURE — 99999 PR PBB SHADOW E&M-EST. PATIENT-LVL IV: CPT | Mod: PBBFAC,,, | Performed by: NURSE PRACTITIONER

## 2025-03-14 RX ORDER — ESTRADIOL 0.1 MG/G
1 CREAM VAGINAL
Qty: 8 G | Refills: 11 | Status: SHIPPED | OUTPATIENT
Start: 2025-03-17 | End: 2026-03-17

## 2025-03-14 RX ORDER — NITROFURANTOIN 25; 75 MG/1; MG/1
100 CAPSULE ORAL 2 TIMES DAILY
Qty: 10 CAPSULE | Refills: 0 | Status: SHIPPED | OUTPATIENT
Start: 2025-03-14 | End: 2025-03-19

## 2025-03-14 NOTE — PROGRESS NOTES
Subjective:       Patient ID: Genie Paul is a 54 y.o. female.    Chief Complaint: Urinary Tract Infection    Abdominal Pain  This is a new problem. The current episode started 1 to 4 weeks ago. The onset quality is gradual. The problem occurs 2 to 4 times per day. The most recent episode lasted 1 hours. The problem has been waxing and waning. The pain is located in the LLQ. The pain is at a severity of 2/10. The pain is mild. The quality of the pain is sharp. Associated symptoms include flatus and frequency. Pertinent negatives include no anorexia, arthralgias, belching, constipation, diarrhea, dysuria, fever, headaches, hematochezia, hematuria, melena, myalgias, vomiting or weight loss. The pain is aggravated by eating. The pain is relieved by Nothing. She has tried antacids for the symptoms. The treatment provided no relief. Her past medical history is significant for GERD. There is no history of abdominal surgery, colon cancer, Crohn's disease, gallstones, irritable bowel syndrome, pancreatitis, PUD or ulcerative colitis. Patient's medical history includes UTI. Patient's medical history does not include kidney stones.   Ms. Paul recognizes this symptom presentation last time she had a UTI.     She would like to see gynecology regarding perimenopause.     Problem List[1]    Current Medications[2]    The following portions of the patient's history were reviewed and updated as appropriate: allergies, past family history, past medical history, past social history and past surgical history.    Review of Systems   Constitutional:  Negative for fever and weight loss.   Gastrointestinal:  Positive for abdominal pain and flatus. Negative for anorexia, constipation, diarrhea, hematochezia, melena and vomiting.   Genitourinary:  Positive for frequency and pelvic pain. Negative for dysuria, hematuria, vaginal bleeding and vaginal discharge.   Musculoskeletal:  Negative for arthralgias and myalgias.   Neurological:   "Negative for headaches.       Objective:      /80   Pulse 73   Ht 5' 5" (1.651 m)   Wt 62.1 kg (137 lb)   LMP 02/11/2025 (Approximate)   SpO2 100%   BMI 22.80 kg/m²     Physical Exam  Constitutional:       General: She is not in acute distress.     Appearance: Normal appearance.   Cardiovascular:      Rate and Rhythm: Normal rate and regular rhythm.      Pulses: Normal pulses.      Heart sounds: Normal heart sounds.   Pulmonary:      Effort: Pulmonary effort is normal.      Breath sounds: Normal breath sounds.   Musculoskeletal:         General: Normal range of motion.   Skin:     General: Skin is warm and dry.   Neurological:      Mental Status: She is alert and oriented to person, place, and time.   Psychiatric:         Mood and Affect: Mood normal.         Behavior: Behavior normal.         Assessment:       1. Pelvic pain    2. Perimenopause        Plan:   Genie was seen today for urinary tract infection.    Diagnoses and all orders for this visit:    Pelvic pain  -     Urinalysis, Reflex to Urine Culture Urine, Clean Catch  -     Urinalysis  -     nitrofurantoin, macrocrystal-monohydrate, (MACROBID) 100 MG capsule; Take 1 capsule (100 mg total) by mouth 2 (two) times daily. for 5 days    Perimenopause  -     Ambulatory referral/consult to Gynecology; Future  -     estradioL (ESTRACE) 0.01 % (0.1 mg/gram) vaginal cream; Place 1 g vaginally twice a week.      Recommendations to follow.          [1]   Patient Active Problem List  Diagnosis    Heterophoria    Alcohol dependence with alcohol-induced mood disorder    Splenic cyst    Intramural, submucous, and subserous leiomyoma of uterus    Multiple renal cysts    Thyroid nodule    Generalized anxiety disorder    Asymptomatic microscopic hematuria    Adjustment disorder with anxious mood    Alcohol use disorder    Anxiety    Other chest pain   [2]   Current Outpatient Medications:     famotidine (PEPCID) 20 MG tablet, Take 1 tablet (20 mg total) by mouth " 2 (two) times daily., Disp: 60 tablet, Rfl: 11    lansoprazole (PREVACID SOLUTAB) 15 MG disintegrating tablet, Take 15 mg by mouth once daily., Disp: , Rfl:     [START ON 3/17/2025] estradioL (ESTRACE) 0.01 % (0.1 mg/gram) vaginal cream, Place 1 g vaginally twice a week., Disp: 8 g, Rfl: 11    nitrofurantoin, macrocrystal-monohydrate, (MACROBID) 100 MG capsule, Take 1 capsule (100 mg total) by mouth 2 (two) times daily. for 5 days, Disp: 10 capsule, Rfl: 0

## 2025-03-17 ENCOUNTER — RESULTS FOLLOW-UP (OUTPATIENT)
Dept: FAMILY MEDICINE | Facility: CLINIC | Age: 55
End: 2025-03-17

## 2025-03-19 ENCOUNTER — OFFICE VISIT (OUTPATIENT)
Dept: PODIATRY | Facility: CLINIC | Age: 55
End: 2025-03-19
Payer: COMMERCIAL

## 2025-03-19 VITALS — WEIGHT: 136.88 LBS | BODY MASS INDEX: 22.81 KG/M2 | HEIGHT: 65 IN

## 2025-03-19 DIAGNOSIS — M24.573 EQUINUS CONTRACTURE OF ANKLE: ICD-10-CM

## 2025-03-19 DIAGNOSIS — M79.671 FOOT PAIN, RIGHT: ICD-10-CM

## 2025-03-19 DIAGNOSIS — M72.2 PLANTAR FASCIITIS: Primary | ICD-10-CM

## 2025-03-19 PROCEDURE — 99999 PR PBB SHADOW E&M-EST. PATIENT-LVL III: CPT | Mod: PBBFAC,,, | Performed by: PODIATRIST

## 2025-03-19 RX ORDER — METHYLPREDNISOLONE 4 MG/1
TABLET ORAL
Qty: 1 EACH | Refills: 0 | Status: SHIPPED | OUTPATIENT
Start: 2025-03-19

## 2025-03-19 RX ORDER — LIDOCAINE AND PRILOCAINE 25; 25 MG/G; MG/G
CREAM TOPICAL
Qty: 30 G | Refills: 3 | Status: SHIPPED | OUTPATIENT
Start: 2025-03-19

## 2025-03-19 NOTE — PROGRESS NOTES
Subjective:      Patient ID: Genie Paul is a 54 y.o. female.    Chief Complaint: Plantar Fasciitis (R foot pain)    Sharp deep pain bottom right heel.  Gradual onset, worsening over past several weeks, aggravated by increased weight bearing, shoe gear, pressure.  No previous medical treatment.  OTC pain med not helping. Denies trauma, surgery.    Review of Systems   Constitutional: Negative for chills, diaphoresis, fever, malaise/fatigue and night sweats.   Cardiovascular:  Negative for claudication, cyanosis, leg swelling and syncope.   Skin:  Negative for color change, dry skin, nail changes, rash, suspicious lesions and unusual hair distribution.   Musculoskeletal:  Negative for falls, joint pain, joint swelling, muscle cramps, muscle weakness and stiffness.   Gastrointestinal:  Negative for constipation, diarrhea, nausea and vomiting.   Neurological:  Negative for brief paralysis, disturbances in coordination, focal weakness, numbness, paresthesias, sensory change and tremors.           Objective:      Physical Exam  Constitutional:       General: She is not in acute distress.     Appearance: She is well-developed. She is not diaphoretic.   Cardiovascular:      Pulses:           Popliteal pulses are 2+ on the right side and 2+ on the left side.        Dorsalis pedis pulses are 2+ on the right side and 2+ on the left side.        Posterior tibial pulses are 2+ on the right side and 2+ on the left side.      Comments: Capillary refill 3 seconds all toes/distal feet, all toes/both feet warm to touch.      Negative lymphadenopathy bilateral popliteal fossa and tarsal tunnel.      Negavie lower extremity edema bilateral.    Musculoskeletal:      Right ankle: No swelling, deformity, ecchymosis or lacerations. Normal range of motion. Normal pulse.      Right Achilles Tendon: Normal. No defects. Asencio's test negative.      Comments: Sharp deep pain to palpation inferior heel right at medial calcaneal tubercle  without ecchymosis, erythema, edema, or cardinal signs infection, and no signs of trauma.    Ankle dorsiflexion decreased at <10 degrees bilateral with moderate increase with knee flexion bilateral.     Lymphadenopathy:      Lower Body: No right inguinal adenopathy. No left inguinal adenopathy.      Comments: Negative lymphadenopathy bilateral popliteal fossa and tarsal tunnel.    Negative lymphangitic streaking bilateral feet/ankles/legs.   Skin:     General: Skin is warm and dry.      Capillary Refill: Capillary refill takes 2 to 3 seconds.      Coloration: Skin is not pale.      Findings: No abrasion, bruising, burn, ecchymosis, erythema, laceration, lesion or rash.      Nails: There is no clubbing.      Comments:   Skin is normal age and health appropriate color, turgor, texture, and temperature bilateral lower extremities without ulceration, hyperpigmentation, discoloration, masses nodules or cords palpated.  No ecchymosis, erythema, edema, or cardinal signs of infection bilateral lower extremities.    Neurological:      Mental Status: She is alert and oriented to person, place, and time.      Sensory: No sensory deficit.      Motor: No tremor, atrophy or abnormal muscle tone.      Gait: Gait normal.      Deep Tendon Reflexes:      Reflex Scores:       Patellar reflexes are 2+ on the right side and 2+ on the left side.       Achilles reflexes are 2+ on the right side and 2+ on the left side.     Comments: Negative tinel sign to percussion sural, superficial peroneal, deep peroneal, saphenous, and posterior tibial nerves right and left ankles and feet.     Psychiatric:         Behavior: Behavior is cooperative.           Assessment:       Encounter Diagnoses   Name Primary?    Plantar fasciitis Yes    Foot pain, right     Equinus contracture of ankle          Plan:       Genie was seen today for plantar fasciitis.    Diagnoses and all orders for this visit:    Plantar fasciitis    Foot pain, right    Equinus  contracture of ankle      I counseled the patient on her conditions, their implications and medical management.        Patient will stretch the tendo achilles complex three times daily as demonstrated in the office.  Literature was dispensed illustrating proper stretching technique.    I applied a plantar rest strapping to the patient's right foot to offload symptomatic area, support the arch, and relieve pain.    Patient will obtain over the counter arch supports and wear them in shoes whenever possible.  Athletic shoes intended for walking or running are usually best.    The patient was advised that NSAID-type medications have two very important potential side effects: gastrointestinal irritation including hemorrhage and renal injuries. She was asked to take the medication with food and to stop if she experiences any GI upset. I asked her to call for vomiting, abdominal pain or black/bloody stools. The patient expresses understanding of these issues and questions were answered.    Discussed conservative treatment with shoes of adequate dimensions, material, and style to alleviate symptoms and delay or prevent surgical intervention.    Medrol dose pack            Follow up in about 1 month (around 4/19/2025).

## 2025-04-03 ENCOUNTER — TELEPHONE (OUTPATIENT)
Dept: UROLOGY | Facility: CLINIC | Age: 55
End: 2025-04-03
Payer: COMMERCIAL

## 2025-04-04 ENCOUNTER — TELEPHONE (OUTPATIENT)
Dept: OBSTETRICS AND GYNECOLOGY | Facility: CLINIC | Age: 55
End: 2025-04-04
Payer: COMMERCIAL

## 2025-04-04 ENCOUNTER — CLINICAL SUPPORT (OUTPATIENT)
Dept: OBSTETRICS AND GYNECOLOGY | Facility: CLINIC | Age: 55
End: 2025-04-04
Payer: COMMERCIAL

## 2025-04-04 DIAGNOSIS — N95.1 MENOPAUSAL SYMPTOMS: Primary | ICD-10-CM

## 2025-04-06 NOTE — PROGRESS NOTES
Personal Information    Full Name: Genie Paul 1970    PCP- Dr. Cristina Rodarte    Medical History    Do you have a chronic medical condition? (e.g., diabetes, hypertension, thyroid issues)   If yes, please specify:   No    2.   Do you have a history of hormone- related conditions? (e.g., endometriosis, breast cancer)   If yes, please explain:   No    3.   Have you previously or are you currently taking hormone replacement therapy?   If yes, please list:   Yes - Estrace Vaginal Cream 0.01% twice weekly     Menstrual History    At what age did you begin menstruating?   14    2.   Have you experienced any changes in your menstrual cycle in the last year?   If yes, please describe:   No    3.   When was your last menstrual period or age of last period?   3/16/2025    4.   Have you had a hysterectomy?   If yes, at what age?  No    Symptoms Assesments      Are you experiencing any of the following symptoms:  Hot Flashes: No   Night Sweats: No   Vaginal Dryness or Pain with [place]: Yes   Mood Swings: Yes   Anxiety or Depression: Yes   Sleep Disturbances: No   Fatigue: Yes   Weight Gain: Yes   Joint or Muscle Pain: No   Memory Issues or Brain Fog: Yes   Decreased Interest in Sex (Low Libido): No     Lifestyle Factors    How would you describe your diet?  Balanced    2.   How often do you exercise?   Regularly    3.   Do you smoke or consume alcohol?   If yes, please specify frequency:   Yes - Social Drinker - Does not smoker     4.   How would you rate your stress levels?   Moderate-High     Family History    Is there a family history of menopause-related conditions? (e.g., osteoporosis, breast cancer)  If yes, please specify  Yes - Mother ovarian or uterine cancer     2.   Is there a family history of heart disease?   If yes, please specify   No  ---------------------------------------------------------------------------  Mammogram 3/2025  Annual/pap  11/2022  ------------------------------------------------------------------------------  Spoke with patient for a total of 30 minutes during virtual visit.  Patient was guided through expectations and treatment options.     Questions answered. Encouraged to send message or call office with any questions/concerns. Verbalized understanding.       Ting

## 2025-05-29 ENCOUNTER — TELEPHONE (OUTPATIENT)
Dept: PODIATRY | Facility: CLINIC | Age: 55
End: 2025-05-29
Payer: COMMERCIAL

## 2025-06-09 ENCOUNTER — PATIENT MESSAGE (OUTPATIENT)
Dept: OBSTETRICS AND GYNECOLOGY | Facility: CLINIC | Age: 55
End: 2025-06-09

## 2025-06-09 ENCOUNTER — LAB VISIT (OUTPATIENT)
Dept: LAB | Facility: OTHER | Age: 55
End: 2025-06-09
Attending: NURSE PRACTITIONER
Payer: COMMERCIAL

## 2025-06-09 ENCOUNTER — OFFICE VISIT (OUTPATIENT)
Dept: OBSTETRICS AND GYNECOLOGY | Facility: CLINIC | Age: 55
End: 2025-06-09
Payer: COMMERCIAL

## 2025-06-09 VITALS — DIASTOLIC BLOOD PRESSURE: 75 MMHG | WEIGHT: 136 LBS | SYSTOLIC BLOOD PRESSURE: 119 MMHG | BODY MASS INDEX: 22.63 KG/M2

## 2025-06-09 DIAGNOSIS — R53.83 FATIGUE, UNSPECIFIED TYPE: ICD-10-CM

## 2025-06-09 DIAGNOSIS — N95.1 PERIMENOPAUSE: ICD-10-CM

## 2025-06-09 DIAGNOSIS — N95.1 PERIMENOPAUSAL SYMPTOMS: Primary | ICD-10-CM

## 2025-06-09 LAB
25(OH)D3+25(OH)D2 SERPL-MCNC: 29 NG/ML (ref 30–96)
FERRITIN SERPL-MCNC: 45 NG/ML (ref 20–300)
IRON SATN MFR SERPL: 27 % (ref 20–50)
IRON SERPL-MCNC: 127 UG/DL (ref 30–160)
TIBC SERPL-MCNC: 465 UG/DL (ref 250–450)
TRANSFERRIN SERPL-MCNC: 314 MG/DL (ref 200–375)
TSH SERPL-ACNC: 1.3 UIU/ML (ref 0.4–4)

## 2025-06-09 PROCEDURE — 99214 OFFICE O/P EST MOD 30 MIN: CPT | Mod: S$GLB,,, | Performed by: NURSE PRACTITIONER

## 2025-06-09 PROCEDURE — 1160F RVW MEDS BY RX/DR IN RCRD: CPT | Mod: CPTII,S$GLB,, | Performed by: NURSE PRACTITIONER

## 2025-06-09 PROCEDURE — 3074F SYST BP LT 130 MM HG: CPT | Mod: CPTII,S$GLB,, | Performed by: NURSE PRACTITIONER

## 2025-06-09 PROCEDURE — 83540 ASSAY OF IRON: CPT

## 2025-06-09 PROCEDURE — 82728 ASSAY OF FERRITIN: CPT

## 2025-06-09 PROCEDURE — 36415 COLL VENOUS BLD VENIPUNCTURE: CPT

## 2025-06-09 PROCEDURE — 99999 PR PBB SHADOW E&M-EST. PATIENT-LVL III: CPT | Mod: PBBFAC,,, | Performed by: NURSE PRACTITIONER

## 2025-06-09 PROCEDURE — 82306 VITAMIN D 25 HYDROXY: CPT

## 2025-06-09 PROCEDURE — 84443 ASSAY THYROID STIM HORMONE: CPT

## 2025-06-09 PROCEDURE — 1159F MED LIST DOCD IN RCRD: CPT | Mod: CPTII,S$GLB,, | Performed by: NURSE PRACTITIONER

## 2025-06-09 PROCEDURE — 3078F DIAST BP <80 MM HG: CPT | Mod: CPTII,S$GLB,, | Performed by: NURSE PRACTITIONER

## 2025-06-09 PROCEDURE — 3008F BODY MASS INDEX DOCD: CPT | Mod: CPTII,S$GLB,, | Performed by: NURSE PRACTITIONER

## 2025-06-09 RX ORDER — PROGESTERONE 200 MG/1
200 CAPSULE ORAL NIGHTLY
Qty: 30 CAPSULE | Refills: 11 | Status: SHIPPED | OUTPATIENT
Start: 2025-06-09 | End: 2026-06-09

## 2025-06-09 NOTE — PROGRESS NOTES
Subjective:      Genie Paul is a 54 y.o. female who presents to discuss hormone replacement therapy. She is having monthly menses. Noting mental fogginess, extreme difficulty with concentration and self initiation, fatigue, weight gain in the past year. She denies the following contraindications to HRT:  Vaginal bleeding, history of VTE/PE, thrombophilia,  breast cancer, or active liver disease.       Current use of Prometrium 100 mg nightly and very low dose of Estradiol patch without improvement of her symptoms.    Prior long term alcohol abuse with remission in the past year with use of Naltrexone.    Hemoglobin A1C   Date Value Ref Range Status   11/21/2024 5.1 4.0 - 5.6 % Final     Comment:     ADA Screening Guidelines:  5.7-6.4%  Consistent with prediabetes  >or=6.5%  Consistent with diabetes    High levels of fetal hemoglobin interfere with the HbA1C  assay. Heterozygous hemoglobin variants (HbS, HgC, etc)do  not significantly interfere with this assay.   However, presence of multiple variants may affect accuracy.     07/09/2021 4.6 4.0 - 5.6 % Final     Comment:     ADA Screening Guidelines:  5.7-6.4%  Consistent with prediabetes  >or=6.5%  Consistent with diabetes    High levels of fetal hemoglobin interfere with the HbA1C  assay. Heterozygous hemoglobin variants (HbS, HgC, etc)do  not significantly interfere with this assay.   However, presence of multiple variants may affect accuracy.             Pap smear: 11/10/2022  Mammogram: 2025      Office Visit on 03/14/2025   Component Date Value Ref Range Status    Specimen UA 03/14/2025 Urine, Clean Catch   Final    Color, UA 03/14/2025 Colorless (A)  Yellow, Straw, Clarissa Final    Appearance, UA 03/14/2025 Clear  Clear Final    pH, UA 03/14/2025 8.0  5.0 - 8.0 Final    Specific Gravity, UA 03/14/2025 1.005  1.005 - 1.030 Final    Protein, UA 03/14/2025 Negative  Negative Final    Glucose, UA 03/14/2025 Negative  Negative Final    Ketones, UA 03/14/2025  Negative  Negative Final    Bilirubin (UA) 03/14/2025 Negative  Negative Final    Occult Blood UA 03/14/2025 Negative  Negative Final    Nitrite, UA 03/14/2025 Negative  Negative Final    Leukocytes, UA 03/14/2025 Trace (A)  Negative Final    Specimen UA 03/14/2025 Urine, Clean Catch   Final    Color, UA 03/14/2025 Colorless (A)  Yellow, Straw, Clarissa Final    Appearance, UA 03/14/2025 Clear  Clear Final    pH, UA 03/14/2025 8.0  5.0 - 8.0 Final    Specific Gravity, UA 03/14/2025 1.005  1.005 - 1.030 Final    Protein, UA 03/14/2025 Negative  Negative Final    Glucose, UA 03/14/2025 Negative  Negative Final    Ketones, UA 03/14/2025 Negative  Negative Final    Bilirubin (UA) 03/14/2025 Negative  Negative Final    Occult Blood UA 03/14/2025 Negative  Negative Final    Nitrite, UA 03/14/2025 Negative  Negative Final    Leukocytes, UA 03/14/2025 Trace (A)  Negative Final    RBC, UA 03/14/2025 0  0 - 4 /hpf Final    WBC, UA 03/14/2025 4  0 - 5 /hpf Final    Bacteria 03/14/2025 Rare  None-Occ /hpf Final    Squam Epithel, UA 03/14/2025 0  /hpf Final    Microscopic Comment 03/14/2025 SEE COMMENT   Final    RBC, UA 03/14/2025 0  0 - 4 /hpf Final    WBC, UA 03/14/2025 3  0 - 5 /hpf Final    Bacteria 03/14/2025 Rare  None-Occ /hpf Final    Squam Epithel, UA 03/14/2025 1  /hpf Final    Microscopic Comment 03/14/2025 SEE COMMENT   Final       Past Medical History:   Diagnosis Date    GERD (gastroesophageal reflux disease)     Hearing loss     Heliotrope eyelid rash 01/06/2023    Uses abstinence for birth control      Past Surgical History:   Procedure Laterality Date    ESOPHAGOGASTRODUODENOSCOPY N/A 1/23/2024    Procedure: EGD (ESOPHAGOGASTRODUODENOSCOPY);  Surgeon: Chai Kovacs MD;  Location: [facility] ENDOSCOPY;  Service: Endoscopy;  Laterality: N/A;  Ref By:  Isamar Hidalgo NP / Prep Specifications: PEG  1/16- pc complete. DBM    EYE SURGERY  not sure    laser surgery for holes     Social History[1]  Family History   Problem  Relation Name Age of Onset    Cancer Mother Serina Paul         uterine possible unsure    Autoimmune disease Mother Serina Paul     Hearing loss Father Vasquez Paul     Alcohol abuse Brother Dionisio Paul     Cancer Brother Robby Paul         melanoma    Early death Brother Robby Paul     Cancer Maternal Aunt Nichole Dos Santos     Cancer Maternal Uncle Juan A Amezcua Jr.     Stroke Maternal Grandmother Viola Amezcua     Cancer Maternal Grandfather Juan A Amezcua     Breast cancer Other maternal aunt     Colon cancer Neg Hx      Ovarian cancer Neg Hx       OB History    Para Term  AB Living   0  0      SAB IAB Ectopic Multiple Live Births            Current Medications[2]    Vitals:    25 1406   BP: 119/75   Weight: 61.7 kg (136 lb)     Body mass index is 22.63 kg/m².    Assessment:    Perimenopause  -     Ambulatory referral/consult to Gynecology        Plan:   Risks and benefits of hormone replacement therapy were discussed.  Hormone replacement therapy options, including bioidentical versus non-bioidentical hormones, as well as alternatives discussed.    Prometrium increased to 200 mg nightly, recommended against systemic Estradiol currently as not near menopause.  Fatigue lab work    Discussed issues of concentration and mental fogginess can be multi factorial with perimenopause. Recommendation FU with psychiatry additionally, given info for Dr. Nara Rahman.    Follow up in 8 weeks for HRT FU and WWE  Will recheck labs once on typical optimal dose or if having side effects.  Instructed patient to call if she experiences any side effects or has any questions.       Judy Bean, FNP-C             [1]   Social History  Tobacco Use    Smoking status: Never     Passive exposure: Never    Smokeless tobacco: Never   Substance Use Topics    Alcohol use: Not Currently     Alcohol/week: 7.0 standard drinks of alcohol     Types: 5 Glasses of wine, 2 Cans of beer per week     Comment: Bottle of wine daily      Drug use: No   [2]   Current Outpatient Medications:     estradioL (ESTRACE) 0.01 % (0.1 mg/gram) vaginal cream, Place 1 g vaginally twice a week., Disp: 8 g, Rfl: 11    lansoprazole (PREVACID SOLUTAB) 15 MG disintegrating tablet, Take 15 mg by mouth once daily., Disp: , Rfl:     famotidine (PEPCID) 20 MG tablet, Take 1 tablet (20 mg total) by mouth 2 (two) times daily., Disp: 60 tablet, Rfl: 11

## 2025-06-10 ENCOUNTER — TELEPHONE (OUTPATIENT)
Dept: OBSTETRICS AND GYNECOLOGY | Facility: CLINIC | Age: 55
End: 2025-06-10
Payer: COMMERCIAL

## 2025-06-10 NOTE — TELEPHONE ENCOUNTER
----- Message from Judy Bean NP sent at 6/10/2025 11:55 AM CDT -----  Please call and notify a different order was needed for Folate and B12, please assist to schedule

## 2025-06-20 ENCOUNTER — LAB VISIT (OUTPATIENT)
Dept: LAB | Facility: OTHER | Age: 55
End: 2025-06-20
Attending: FAMILY MEDICINE
Payer: COMMERCIAL

## 2025-06-20 DIAGNOSIS — R53.83 FATIGUE, UNSPECIFIED TYPE: ICD-10-CM

## 2025-06-20 LAB
FOLATE SERPL-MCNC: 17.6 NG/ML (ref 4–24)
VIT B12 SERPL-MCNC: 984 PG/ML (ref 210–950)

## 2025-06-20 PROCEDURE — 82746 ASSAY OF FOLIC ACID SERUM: CPT

## 2025-06-20 PROCEDURE — 82607 VITAMIN B-12: CPT

## 2025-06-20 PROCEDURE — 36415 COLL VENOUS BLD VENIPUNCTURE: CPT

## 2025-06-21 ENCOUNTER — PATIENT MESSAGE (OUTPATIENT)
Dept: OBSTETRICS AND GYNECOLOGY | Facility: CLINIC | Age: 55
End: 2025-06-21
Payer: COMMERCIAL

## 2025-06-23 ENCOUNTER — RESULTS FOLLOW-UP (OUTPATIENT)
Dept: OBSTETRICS AND GYNECOLOGY | Facility: CLINIC | Age: 55
End: 2025-06-23

## 2025-07-09 ENCOUNTER — OFFICE VISIT (OUTPATIENT)
Dept: PODIATRY | Facility: CLINIC | Age: 55
End: 2025-07-09
Payer: COMMERCIAL

## 2025-07-09 ENCOUNTER — OFFICE VISIT (OUTPATIENT)
Dept: INTERNAL MEDICINE | Facility: CLINIC | Age: 55
End: 2025-07-09
Payer: COMMERCIAL

## 2025-07-09 VITALS
DIASTOLIC BLOOD PRESSURE: 74 MMHG | WEIGHT: 139.31 LBS | HEIGHT: 66 IN | OXYGEN SATURATION: 97 % | SYSTOLIC BLOOD PRESSURE: 122 MMHG | BODY MASS INDEX: 22.39 KG/M2 | HEART RATE: 68 BPM

## 2025-07-09 VITALS
HEIGHT: 65 IN | BODY MASS INDEX: 22.66 KG/M2 | SYSTOLIC BLOOD PRESSURE: 132 MMHG | HEART RATE: 84 BPM | WEIGHT: 136 LBS | DIASTOLIC BLOOD PRESSURE: 81 MMHG

## 2025-07-09 DIAGNOSIS — F41.1 GENERALIZED ANXIETY DISORDER: ICD-10-CM

## 2025-07-09 DIAGNOSIS — M72.2 PLANTAR FASCIITIS: Primary | ICD-10-CM

## 2025-07-09 DIAGNOSIS — M79.662 PAIN OF LEFT CALF: Primary | ICD-10-CM

## 2025-07-09 DIAGNOSIS — K21.9 GASTROESOPHAGEAL REFLUX DISEASE, UNSPECIFIED WHETHER ESOPHAGITIS PRESENT: ICD-10-CM

## 2025-07-09 PROCEDURE — 99999 PR PBB SHADOW E&M-EST. PATIENT-LVL III: CPT | Mod: PBBFAC,,, | Performed by: PODIATRIST

## 2025-07-09 PROCEDURE — 3008F BODY MASS INDEX DOCD: CPT | Mod: CPTII,S$GLB,,

## 2025-07-09 PROCEDURE — 99213 OFFICE O/P EST LOW 20 MIN: CPT | Mod: S$GLB,,,

## 2025-07-09 PROCEDURE — 99999 PR PBB SHADOW E&M-EST. PATIENT-LVL III: CPT | Mod: PBBFAC,,,

## 2025-07-09 PROCEDURE — 3078F DIAST BP <80 MM HG: CPT | Mod: CPTII,S$GLB,,

## 2025-07-09 PROCEDURE — 3074F SYST BP LT 130 MM HG: CPT | Mod: CPTII,S$GLB,,

## 2025-07-09 RX ORDER — TRIAMCINOLONE ACETONIDE 40 MG/ML
40 INJECTION, SUSPENSION INTRA-ARTICULAR; INTRAMUSCULAR
Status: COMPLETED | OUTPATIENT
Start: 2025-07-09 | End: 2025-07-09

## 2025-07-09 RX ORDER — DEXAMETHASONE SODIUM PHOSPHATE 4 MG/ML
4 INJECTION, SOLUTION INTRA-ARTICULAR; INTRALESIONAL; INTRAMUSCULAR; INTRAVENOUS; SOFT TISSUE
Status: COMPLETED | OUTPATIENT
Start: 2025-07-09 | End: 2025-07-09

## 2025-07-09 RX ADMIN — TRIAMCINOLONE ACETONIDE 40 MG: 40 INJECTION, SUSPENSION INTRA-ARTICULAR; INTRAMUSCULAR at 03:07

## 2025-07-09 RX ADMIN — DEXAMETHASONE SODIUM PHOSPHATE 4 MG: 4 INJECTION, SOLUTION INTRA-ARTICULAR; INTRALESIONAL; INTRAMUSCULAR; INTRAVENOUS; SOFT TISSUE at 03:07

## 2025-07-09 NOTE — PROGRESS NOTES
"INTERNAL MEDICINE PROGRESS/URGENT CARE NOTE    CHIEF COMPLAINT     Leg pains and concerns about possible blood clot after recent flight.      HPI     Genie Paul is a 54 y.o. female who presents for an urgent care visit today.    Reports left calf pain that began after a 3-hour flight on Monday. The pain is "aching" that runs down the leg  (4/10). Whole leg achiness and foot tingling. Pain is intermittent, exacerbated by walking. Denies leg injury or recent exercise. Denies CP, SOB, STILES, erythema, leg swelling, recent surgery, or prior DVT history.  Family history of blood clots (mother)    Recent reflux symptoms resolved with two antacids. Managed by gastroenterology       Past Medical History:  Past Medical History:   Diagnosis Date    GERD (gastroesophageal reflux disease)     Hearing loss     Heliotrope eyelid rash 01/06/2023    Uses abstinence for birth control      Review of Systems:  Review of Systems   Constitutional: Negative.  Negative for activity change, appetite change, chills, fatigue, fever and unexpected weight change.   HENT: Negative.  Negative for congestion, ear pain, sinus pain and sore throat.    Eyes: Negative.  Negative for pain.   Respiratory: Negative.  Negative for cough, chest tightness, shortness of breath and wheezing.    Cardiovascular: Negative.  Negative for chest pain, palpitations and leg swelling.   Gastrointestinal: Negative.  Negative for abdominal distention, abdominal pain, blood in stool, constipation, diarrhea, nausea and vomiting.   Endocrine: Negative.    Genitourinary:  Negative for difficulty urinating, dysuria, flank pain and urgency.   Musculoskeletal:  Positive for myalgias. Negative for gait problem.   Skin: Negative.  Negative for color change, rash and wound.   Neurological: Negative.  Negative for dizziness, light-headedness and headaches.     Home Medications:  Prior to Admission medications    Medication Sig Start Date End Date Taking? Authorizing Provider " "  estradioL (ESTRACE) 0.01 % (0.1 mg/gram) vaginal cream Place 1 g vaginally twice a week. 3/17/25 3/17/26  Ruthie Katz, NP   famotidine (PEPCID) 20 MG tablet Take 1 tablet (20 mg total) by mouth 2 (two) times daily. 5/13/24 5/13/25  Isamar Hidalgo NP   lansoprazole (PREVACID SOLUTAB) 15 MG disintegrating tablet Take 15 mg by mouth once daily.    Provider, Historical   progesterone (PROMETRIUM) 200 MG capsule Take 1 capsule (200 mg total) by mouth nightly. 6/9/25 6/9/26  Judy Bean NP     PHYSICAL EXAM     /74   Pulse 68   Ht 5' 5.5" (1.664 m)   Wt 63.2 kg (139 lb 5.3 oz)   LMP 05/28/2025 (Exact Date) Comment: BC: Abstinence  SpO2 97%   BMI 22.83 kg/m²     Physical Exam  Constitutional:       Appearance: Normal appearance. She is normal weight.   HENT:      Head: Normocephalic and atraumatic.      Nose: Nose normal.   Eyes:      Conjunctiva/sclera: Conjunctivae normal.   Cardiovascular:      Rate and Rhythm: Normal rate and regular rhythm.      Pulses:           Dorsalis pedis pulses are 2+ on the right side and 2+ on the left side.        Posterior tibial pulses are 2+ on the right side and 2+ on the left side.      Heart sounds: No murmur heard.     Comments: No unilateral leg swelling/asymmetry, calf tenderness, erythema, or warmth. Pulses intact and symmetric. Negative Clint's sign. Trace edema of left ankle.   Pulmonary:      Effort: Pulmonary effort is normal.      Breath sounds: Normal breath sounds. No wheezing.   Chest:      Chest wall: No tenderness.   Musculoskeletal:         General: Normal range of motion.      Cervical back: Normal range of motion.   Skin:     General: Skin is warm.   Neurological:      General: No focal deficit present.      Mental Status: She is alert and oriented to person, place, and time.   Psychiatric:         Mood and Affect: Mood normal.         Behavior: Behavior normal.         Thought Content: Thought content normal.         Judgment: " Judgment normal.       ASSESSMENT/PLAN     Pain of left calf  - Assessed left calf pain and very mild ankle edema following a 3-hour flight.   - Reports pain in left calf (rated 4/10 after flight, currently 2/10) and tingling in foot.  - No redness, warmth or visible bump noted  - Clinical suspicion of DVT is low despite recent air travel, however there is still a possibility  - CV Ultrasound doppler venous DVT leg left; Future    2. Generalized anxiety disorder  - Pt reports anxiety was a one time occurrence and symptoms have resolved  - Continue to monitor  - Followed by PCP    3. GERD  - Evaluated cardiovascular health, noting history of reflux-related chest pain managed by cardiologist  - Reviewed effectiveness of current medications  - Continue  lansoprazole 15 mg daily and famotidine as needed      Patient education provided from Billie. Patient was counseled on when and how to seek emergent care.   This note was generated with the assistance of ambient listening technology. Verbal consent was obtained by the patient and accompanying visitor(s) for the recording of patient appointment to facilitate this note. I attest to having reviewed and edited the generated note for accuracy, though some syntax or spelling errors may persist. Please contact the author of this note for any clarification.       Sarah Beth KIMBALL, APRN, FNP-c   Department of Internal Medicine - [facility]  3:56 PM

## 2025-07-09 NOTE — PROGRESS NOTES
Subjective:      Patient ID: Genie Paul is a 54 y.o. female.    Chief Complaint: Heel Pain (Right, constant 3, not going away, wants to know next steps for relief)    Genie is a 54 y.o. female who presents to the clinic complaining of heel pain in the right foot, especially with the first step in the morning. The pain is described as Aching and Throbbing. The onset of the pain was sudden and has worsened over the past several weeks. Genie rates the pain as 6/10. She denies a history of trauma. Prior treatments include medrol that didn't work well.    Review of Systems   Constitutional: Negative for chills, fever and malaise/fatigue.   HENT:  Negative for hearing loss.    Cardiovascular:  Negative for claudication.   Respiratory:  Negative for shortness of breath.    Skin:  Negative for flushing and rash.   Musculoskeletal:  Negative for joint pain and myalgias.   Neurological:  Negative for loss of balance, numbness, paresthesias and sensory change.   Psychiatric/Behavioral:  Negative for altered mental status.          Objective:      Physical Exam  Vitals reviewed.   Cardiovascular:      Pulses:           Dorsalis pedis pulses are 2+ on the right side and 2+ on the left side.        Posterior tibial pulses are 2+ on the right side and 2+ on the left side.      Comments: No edema noted b/L  Musculoskeletal:         General: Tenderness present.      Comments:        Feet:      Right foot:      Protective Sensation: 5 sites tested.  5 sites sensed.      Left foot:      Protective Sensation: 5 sites tested.  5 sites sensed.   Skin:     General: Skin is warm.      Capillary Refill: Capillary refill takes 2 to 3 seconds.      Comments: Normal skin tugor noted.   No open lesion noted b/L  Skin temp is warm to warm from proximal to distal b/L.  Webspaces clean, dry, and intact     Neurological:      Mental Status: She is alert and oriented to person, place, and time.      Comments: Gross sensation intact b/L     Non  reducible equinus noted to right lower extremity  Pain upon palpation to the medial heel, right            Assessment:       Encounter Diagnosis   Name Primary?    Plantar fasciitis Yes         Plan:       Genie was seen today for heel pain.    Diagnoses and all orders for this visit:    Plantar fasciitis    Other orders  -     dexAMETHasone injection 4 mg  -     triamcinolone acetonide injection 40 mg      I counseled the patient on her conditions, their implications and medical management.        .Etiologies of plantar fasciitis discussed with the pt. Pt advised to stretch the achilles tendon complex daily and especially before increased activity and sports. Pt advised on RICE and OTC NSAIDs for residual pain. Pt advised to purchase gel heel cups to be worn in shoes.    Patient instructed on adequate icing techniques. Patient should ice the affected area at least once per day x 10 minutes for 10 days . I advised the  patient that extra icing would also be beneficial to ensure adequate anti inflammatory effect       Injection consisting of 1:1:1 mixture of 1% lidocaine plain, dexamethasone, and kenalog 40 given into the rightheel, pt tolerated the injection well.   RTC in 2 weeks or sooner if any new pedal problems should arise or if condition worsens.

## 2025-08-05 ENCOUNTER — PATIENT MESSAGE (OUTPATIENT)
Dept: DERMATOLOGY | Facility: CLINIC | Age: 55
End: 2025-08-05
Payer: COMMERCIAL

## 2025-08-05 ENCOUNTER — OFFICE VISIT (OUTPATIENT)
Dept: OBSTETRICS AND GYNECOLOGY | Facility: CLINIC | Age: 55
End: 2025-08-05
Payer: COMMERCIAL

## 2025-08-05 DIAGNOSIS — N95.1 PERIMENOPAUSAL SYMPTOMS: Primary | ICD-10-CM

## 2025-08-05 DIAGNOSIS — R53.83 FATIGUE, UNSPECIFIED TYPE: ICD-10-CM

## 2025-08-05 DIAGNOSIS — F32.A DEPRESSION, UNSPECIFIED DEPRESSION TYPE: ICD-10-CM

## 2025-08-05 DIAGNOSIS — R41.840 LACK OF CONCENTRATION: ICD-10-CM

## 2025-08-05 PROCEDURE — 1160F RVW MEDS BY RX/DR IN RCRD: CPT | Mod: CPTII,95,, | Performed by: NURSE PRACTITIONER

## 2025-08-05 PROCEDURE — 98006 SYNCH AUDIO-VIDEO EST MOD 30: CPT | Mod: 95,,, | Performed by: NURSE PRACTITIONER

## 2025-08-05 PROCEDURE — 1159F MED LIST DOCD IN RCRD: CPT | Mod: CPTII,95,, | Performed by: NURSE PRACTITIONER

## 2025-08-05 NOTE — PROGRESS NOTES
The patient location is: [place]  The chief complaint leading to consultation is: HRT FU    Visit type: audiovisual    Face to Face time with patient: 20 minutes  20 minutes of total time spent on the encounter, which includes face to face time and non-face to face time preparing to see the patient (eg, review of tests), Obtaining and/or reviewing separately obtained history, Documenting clinical information in the electronic or other health record, Independently interpreting results (not separately reported) and communicating results to the patient/family/caregiver, or Care coordination (not separately reported).         Each patient to whom he or she provides medical services by telemedicine is:  (1) informed of the relationship between the physician and patient and the respective role of any other health care provider with respect to management of the patient; and (2) notified that he or she may decline to receive medical services by telemedicine and may withdraw from such care at any time.    Notes:     Subjective:      Genie Paul is a 55 y.o. female who is here for follow-up of hormone replacement therapy via virtual visit. She is perimenopausal and having irregular cycles. Prior use of low dose systemic Estradiol, discontinued with resolution of significant breast tenderness. Prometrium 200 mg PO QHS continued.    Persistent daily fatigue, difficulty with self starting, lowered concentration, and mental fogginess. No change to this symptoms with prior use of systemic Estradiol. History of alcohol dependence with long term remission. Some depressive features due to issues of executive functioning. Women's specialist psychiatrist recommended at last visit, however, reports provider does not take insurance. Denies SI/HI.    Lab Visit on 06/20/2025   Component Date Value Ref Range Status    Folate Level 06/20/2025 17.6  4.0 - 24.0 ng/mL Final    Vitamin B12 06/20/2025 984 (H)  210 - 950 pg/mL Final   Lab  Visit on 2025   Component Date Value Ref Range Status    Vitamin D 2025 29 (L)  30 - 96 ng/mL Final    TSH 2025 1.304  0.400 - 4.000 uIU/mL Final    Iron Level 2025 127  30 - 160 ug/dL Final    Transferrin 2025 314  200 - 375 mg/dL Final    Iron Binding Capacity Total 2025 465 (H)  250 - 450 ug/dL Final    Iron Saturation 2025 27  20 - 50 % Final    Ferritin 2025 45.0  20.0 - 300.0 ng/mL Final       Past Medical History:   Diagnosis Date    GERD (gastroesophageal reflux disease)     Hearing loss     Heliotrope eyelid rash 2023    Uses abstinence for birth control      Past Surgical History:   Procedure Laterality Date    ESOPHAGOGASTRODUODENOSCOPY N/A 2024    Procedure: EGD (ESOPHAGOGASTRODUODENOSCOPY);  Surgeon: Chai Kovacs MD;  Location: [facility] ENDOSCOPY;  Service: Endoscopy;  Laterality: N/A;  Ref By:  Isamar Hidalgo NP / Prep Specifications: PEG  - pc complete. DBM    EYE SURGERY  not sure    laser surgery for holes     Social History[1]  Family History   Problem Relation Name Age of Onset    Cancer Mother Serina Paul         uterine possible unsure    Autoimmune disease Mother Serina Paul     Hearing loss Father Vasquezkeri Paul     Alcohol abuse Brother Dionisio Paul     Cancer Brother Robby Paul         melanoma    Early death Brother Robby Paul     Cancer Maternal Aunt Nichole Dos Santos     Cancer Maternal Uncle Juan A Amezcua Jr.     Stroke Maternal Grandmother Viola Amezcua     Cancer Maternal Grandfather Juan A Amezcua     Breast cancer Other maternal aunt     Colon cancer Neg Hx      Ovarian cancer Neg Hx       OB History    Para Term  AB Living   0  0      SAB IAB Ectopic Multiple Live Births            Current Medications[2]    There were no vitals filed for this visit.  There is no height or weight on file to calculate BMI.       Assessment:    Perimenopausal symptoms  -     Follicle Stimulating Hormone; Future; Expected date:  08/05/2025  -     Estradiol; Future; Expected date: 08/05/2025    Depression, unspecified depression type  -     Ambulatory referral/consult to Psychiatry; Future; Expected date: 08/12/2025    Fatigue, unspecified type  -     Ambulatory referral/consult to Psychiatry; Future; Expected date: 08/12/2025    Lack of concentration  -     Ambulatory referral/consult to Psychiatry; Future; Expected date: 08/12/2025        Plan:   Risks and benefits of hormone replacement therapy were discussed.  Hormone replacement therapy options, including bioidentical versus non-bioidentical hormones, as well as alternatives discussed.    Will continue Prometrium 200 mg PO QHS. Discussed no change to symptoms despite prior use of systemic Estradiol, improvement of breast tenderness with stopping.    Recommended follow up with psychiatry, referral placed. SI/HI precautions.    Follow up in 3 months.  Instructed patient to call if she experiences any side effects or has any questions.       Judy Bean, FNP-C                     [1]   Social History  Tobacco Use    Smoking status: Never     Passive exposure: Never    Smokeless tobacco: Never   Substance Use Topics    Alcohol use: Not Currently     Alcohol/week: 7.0 standard drinks of alcohol     Types: 5 Glasses of wine, 2 Cans of beer per week     Comment: Bottle of wine daily     Drug use: No   [2]   Current Outpatient Medications:     estradioL (ESTRACE) 0.01 % (0.1 mg/gram) vaginal cream, Place 1 g vaginally twice a week., Disp: 8 g, Rfl: 11    famotidine (PEPCID) 20 MG tablet, Take 1 tablet (20 mg total) by mouth 2 (two) times daily., Disp: 60 tablet, Rfl: 11    lansoprazole (PREVACID SOLUTAB) 15 MG disintegrating tablet, Take 15 mg by mouth once daily., Disp: , Rfl:     progesterone (PROMETRIUM) 200 MG capsule, Take 1 capsule (200 mg total) by mouth nightly., Disp: 30 capsule, Rfl: 11

## 2025-08-08 ENCOUNTER — TELEPHONE (OUTPATIENT)
Dept: OBSTETRICS AND GYNECOLOGY | Facility: CLINIC | Age: 55
End: 2025-08-08
Payer: COMMERCIAL